# Patient Record
Sex: FEMALE | Race: OTHER | Employment: OTHER | URBAN - METROPOLITAN AREA
[De-identification: names, ages, dates, MRNs, and addresses within clinical notes are randomized per-mention and may not be internally consistent; named-entity substitution may affect disease eponyms.]

---

## 2022-03-28 ENCOUNTER — OFFICE VISIT (OUTPATIENT)
Dept: FAMILY MEDICINE CLINIC | Age: 27
End: 2022-03-28

## 2022-03-28 VITALS
SYSTOLIC BLOOD PRESSURE: 115 MMHG | BODY MASS INDEX: 25.82 KG/M2 | OXYGEN SATURATION: 98 % | WEIGHT: 123 LBS | TEMPERATURE: 97.5 F | HEART RATE: 81 BPM | DIASTOLIC BLOOD PRESSURE: 76 MMHG | HEIGHT: 58 IN | RESPIRATION RATE: 17 BRPM

## 2022-03-28 DIAGNOSIS — N92.6 MISSED MENSES: Primary | ICD-10-CM

## 2022-03-28 DIAGNOSIS — O21.9 PREGNANCY RELATED NAUSEA AND VOMITING, ANTEPARTUM: ICD-10-CM

## 2022-03-28 LAB
HCG URINE, QL. (POC): POSITIVE
VALID INTERNAL CONTROL?: YES

## 2022-03-28 PROCEDURE — 81025 URINE PREGNANCY TEST: CPT

## 2022-03-28 PROCEDURE — 0500F INITIAL PRENATAL CARE VISIT: CPT

## 2022-03-28 RX ORDER — PYRIDOXINE HCL (VITAMIN B6) 25 MG
25 TABLET ORAL DAILY
Qty: 30 TABLET | Refills: 2 | Status: SHIPPED | OUTPATIENT
Start: 2022-03-28 | End: 2022-08-31

## 2022-03-28 NOTE — PROGRESS NOTES
Chief Complaint   Patient presents with    Missed Menses    Establish Care     1. Have you been to the ER, urgent care clinic since your last visit? Hospitalized since your last visit? N/A    2. Have you seen or consulted any other health care providers outside of the [address] [place] [address] since your last visit? Include any pap smears or colon screening.  N/A

## 2022-03-28 NOTE — PROGRESS NOTES
[address] [facility] [address] [address]   Office [phone], Fax [phone]    Subjective:     Chief Complaint   Patient presents with    Missed Menses    Establish Care       History provided by patient     HPI:  Naresh Wolff is a 32 y.o.  female with past medical history of  section x2 presents for   Chief Complaint   Patient presents with    Missed Menses    Establish Care       [id number]   LMP - Approx 2022  Home Pregnancy test: positive 1 week ago  This is a desired pregnancy     History of Depression in pregnancy or post partum? No  History of GDM or DM? No  History of GHTN or HTN? No  History of pre-eclampsia? No  History of thyroid disorder? No  History of PTD? First   History of ? CS x2. Both in [place]. History of Genetic disorders? No   History of STD's? No  History of abnormal PAP? Never had a Pap  Taking prenatal vitamins? Not yet   Nausea: vomiting multiple times daily     Social: Sister and two children. FOB not involved, and patient does not want FOB involved. Pt denies fever, chills, HA, vision disturbances, RUQ pain, chest pain, SOB, N/V, urinary problems, vaginal bleeding, foul smelling vaginal discharge, loss of fluid, or contractions.        Social History     Socioeconomic History    Marital status: SINGLE     Spouse name: Not on file    Number of children: Not on file    Years of education: Not on file    Highest education level: Not on file   Occupational History    Not on file   Tobacco Use    Smoking status: Never Smoker    Smokeless tobacco: Never Used   Substance and Sexual Activity    Alcohol use: Never    Drug use: Never    Sexual activity: Not on file   Other Topics Concern    Not on file   Social History Narrative    Not on file     Social Determinants of Health     Financial Resource Strain:     Difficulty of Paying Living Expenses: Not on file   Food Insecurity:     Worried About [address] in the Last Year: Not on file    Ran Out of Food in the Last Year: Not on file   Transportation Needs:     Lack of Transportation (Medical): Not on file    Lack of Transportation (Non-Medical): Not on file   Physical Activity:     Days of Exercise per Week: Not on file    Minutes of Exercise per Session: Not on file   Stress:     Feeling of Stress : Not on file   Social Connections:     Frequency of Communication with Friends and Family: Not on file    Frequency of Social Gatherings with Friends and Family: Not on file    Attends Taoist Services: Not on file    Active Member of [address] [place] [address] [employer] or Organizations: Not on file    Attends Club or Organization Meetings: Not on file    Marital Status: Not on file   Intimate Partner Violence:     Fear of Current or Ex-Partner: Not on file    Emotionally Abused: Not on file    Physically Abused: Not on file    Sexually Abused: Not on file   Housing Stability:     Unable to Pay for Housing in the Last Year: Not on file    Number of [place] in the Last Year: Not on file    Unstable Housing in the Last Year: Not on file         Review of Systems   Constitutional: Negative for chills and fever. HENT: Negative for congestion, ear pain, hearing loss and sore throat. Eyes: Negative for blurred vision and pain. Respiratory: Negative for cough and shortness of breath. Cardiovascular: Negative for chest pain, palpitations and leg swelling. Gastrointestinal: Positive for nausea and vomiting. Negative for abdominal pain and diarrhea. Genitourinary: Negative for dysuria. Musculoskeletal: Negative for back pain. Skin: Negative for rash. Neurological: Negative for sensory change, focal weakness and headaches. Endo/Heme/Allergies: Does not bruise/bleed easily. Psychiatric/Behavioral: Negative for depression and suicidal ideas.          Objective:     Visit Vitals  /76   Pulse 81   Temp 97.5 °F (36.4 °C) (Temporal)   Resp 17   Ht 4' 9.5\" (1.461 m)   Wt 123 lb (55.8 kg)   LMP 2022 (Exact Date)   SpO2 98%   BMI 26.16 kg/m²          Physical Exam  Constitutional:       Appearance: Normal appearance. She is normal weight. HENT:      Head: Normocephalic. Cardiovascular:      Rate and Rhythm: Normal rate and regular rhythm. Pulses: Normal pulses. Heart sounds: Normal heart sounds. Pulmonary:      Effort: Pulmonary effort is normal.      Breath sounds: Normal breath sounds. Musculoskeletal:      Cervical back: Normal range of motion. Neurological:      Mental Status: She is alert. Pertinent Labs/Studies:       Assessment and orders:       ICD-10-CM ICD-9-CM    1. Missed menses  N92.6 626.4 AMB POC URINE PREGNANCY TEST, VISUAL COLOR COMPARISON   2. Pregnancy related nausea and vomiting, antepartum  O21.9 643.93            A/P:    Missed Menses: 25yo [id number] with missed menses now confirmed pregnancy in clinic. EGA 7 weeks 3 days by LMP, JOSH 2022.   - schedule IOB in approx 4 weeks   - start prenatal vitamins    Nausea/Vomiting in setting of Pregnancy: multiple times per day, however still able to tolerate liquids and some light meals   - start B6 /  Unisom     Hx : Patient endorses history of two prior C Sections, both performed in [place]. Patient states first due to fetal intolerance. Both external incisions low transverse, no records available. - will pursue repeat CS at delivery     Pt was discussed with Dr. Glenn White (attending physician). I have reviewed patient medical and social history and medications. I have reviewed pertinent labs results and other data. I have discussed the diagnosis with the patient and the intended plan as seen in the above orders. The patient has received an after-visit summary and questions were answered concerning future plans. I have discussed medication side effects and warnings with the patient as well.     Sepideh Bangura MD  Resident [address] [facility]  22

## 2022-03-30 PROBLEM — N92.6 MISSED MENSES: Status: ACTIVE | Noted: 2022-03-30

## 2022-04-25 ENCOUNTER — TELEPHONE (OUTPATIENT)
Dept: FAMILY MEDICINE CLINIC | Age: 27
End: 2022-04-25

## 2022-04-25 NOTE — TELEPHONE ENCOUNTER
----- Message from Bradley Tobin sent at 4/25/2022  3:06 PM EDT -----  Subject: Message to Provider    QUESTIONS  Information for Provider? patient is returning a call on 4/25/22 @3:06   ---------------------------------------------------------------------------  --------------  CALL BACK INFO  What is the best way for the office to contact you? OK to leave message on   voicemail  Preferred Call Back Phone Number?  [phone]  ---------------------------------------------------------------------------  --------------  SCRIPT ANSWERS  undefined

## 2022-05-02 ENCOUNTER — INITIAL PRENATAL (OUTPATIENT)
Dept: FAMILY MEDICINE CLINIC | Age: 27
End: 2022-05-02

## 2022-05-02 VITALS
OXYGEN SATURATION: 99 % | SYSTOLIC BLOOD PRESSURE: 135 MMHG | HEIGHT: 58 IN | WEIGHT: 127.4 LBS | HEART RATE: 62 BPM | BODY MASS INDEX: 26.74 KG/M2 | RESPIRATION RATE: 16 BRPM | DIASTOLIC BLOOD PRESSURE: 72 MMHG

## 2022-05-02 DIAGNOSIS — Z3A.14 14 WEEKS GESTATION OF PREGNANCY: Primary | ICD-10-CM

## 2022-05-02 PROCEDURE — 99203 OFFICE O/P NEW LOW 30 MIN: CPT | Performed by: STUDENT IN AN ORGANIZED HEALTH CARE EDUCATION/TRAINING PROGRAM

## 2022-05-02 PROCEDURE — 0501F PRENATAL FLOW SHEET: CPT | Performed by: STUDENT IN AN ORGANIZED HEALTH CARE EDUCATION/TRAINING PROGRAM

## 2022-05-02 NOTE — PROGRESS NOTES
Chief Complaint   Patient presents with    Initial Prenatal Visit      here today for her initial OB visit. States she is taking the unisom and B6 and it is helping.      Visit Vitals  /72 (BP 1 Location: Left arm, BP Patient Position: Sitting, BP Cuff Size: Adult)   Pulse 62   Resp 16   Ht 4' 9.5\" (1.461 m)   Wt 127 lb 6.4 oz (57.8 kg)   SpO2 99%   BMI 27.09 kg/m²

## 2022-05-02 NOTE — PROGRESS NOTES
I reviewed with the resident the medical history and the resident's findings on the physical examination. I discussed with the resident the patient's diagnosis and concur with the plan. 26yo  @ 14w4d by 14 wk campos  1. IUP: cannot do IOB labs today as lab closed  2. Hx PTD: bleeding at [address] [facility] per pt report   3.   Hx CS: x2     Seen by UBB

## 2022-05-02 NOTE — PROGRESS NOTES
Subjective:   Yordan Nicole is a 32 y.o.  who is being seen today for her first obstetrical visit. This is not a planned pregnancy. Nervous today, mostly for the pelvic exam. FOB is known but not going to be involved. She is at 12w1d gestation by LMP. However this was only one day of bleeding which is less than her normal. Possibly implantation bleeding? OB History    Para Term  AB Living   3 2 1 1 0 2   SAB IAB Ectopic Molar Multiple Live Births   0 0 0 0 0 2      # Outcome Date GA Lbr Rodolfo/2nd Weight Sex Delivery Anes PTL Lv   3 Current            2 Term 03/26/15 39w0d  7 lb (3.175 kg) M CS-Unspec   DISHA   1  13 36w0d  6 lb (2.722 kg) M CS-Unspec   DISHA      Birth Comments: Section due to  bleeding, unknown details        History of GDM or DM? No  History of GHTN or HTN? No  History of pre-eclampsia? No  Taking prenatal vitamins? Yes  Desires genetic testing? Yes    Allergies  No Known Allergies    Medications  Current Outpatient Medications   Medication Sig    doxylamine succinate (UNISOM) 25 mg tablet Take 1 Tablet by mouth nightly as needed for Nausea.  pyridoxine, vitamin B6, (VITAMIN B-6) 25 mg tablet Take 1 Tablet by mouth daily. No current facility-administered medications for this visit. Past Medical History  No past medical history on file. STDs? Denies    Past Surgical History  Past Surgical History:   Procedure Laterality Date    HX  SECTION  ,      CSections? Yes x2    Family History  Family History   Problem Relation Age of Onset    No Known Problems Mother     No Known Problems Father     No Known Problems Sister     No Known Problems Brother     No Known Problems Maternal Grandmother     Diabetes Maternal Grandfather     No Known Problems Paternal Grandmother     No Known Problems Paternal Grandfather      Genetic abnormalities?  Denies    Social History  Social History     Tobacco Use    Smoking status: Never Smoker    Smokeless tobacco: Never Used   Substance Use Topics    Alcohol use: Never    Drug use: Never       Objective:   Vital Signs  Visit Vitals  /72 (BP 1 Location: Left arm, BP Patient Position: Sitting, BP Cuff Size: Adult)   Pulse 62   Resp 16   Ht 4' 9.5\" (1.461 m)   Wt 127 lb 6.4 oz (57.8 kg)   LMP 2022 (Exact Date)   SpO2 99%   BMI 27.09 kg/m²       See physical exam on flowsheet  Pelvic exam not performed today as need to collect Pap but lab was unavailable. Will perform next visit. Labs  None    OB Dating Ultrasound    Patient is a 32 y.o.  with an estimated gestational age of 15w2d by LMP who presents for dating ultrasound. [facility]  OFFICE PROCEDURE PROGRESS NOTE    Chart reviewed for the following:   Kimber Whitt MD, have reviewed the History, Physical and updated the Allergic reactions for [id number] performed immediately prior to start of procedure:   Kimber Whitt MD, have performed the following reviews on Genesis Nazario prior to the start of the procedure:            * Patient was identified by name and date of birth   * Agreement on procedure being performed was verified  * Risks and Benefits explained to the patient  * Procedure site verified and marked as necessary  * Patient was positioned for comfort  * Consent was signed and verified     Time: 0830  Date of procedure: 2022  Procedure performed by:  Js Thomas MD and Dr. Jair Brush, DO  How tolerated by patient: tolerated the procedure well with no complications    Ultrasound type:  Transabdominal  Findings: single IUP with +cardiac activity, fetus active. Fluid: grossly normal     GA by LMP: 12w1d  GA by AUA: 14w4d    JOSH by ultrasound today IS NOT consistent with JOSH by LMP. CHANGE JOSH: 10/27/22  Js Thomas MD      Assessment and Plan:   Genesis Nazario is a 32 y.o.  @ 14w4d by 1st tri US here for initial OB visit.  Estimated Date of Delivery: 11/13/22    · IOB labs NOT COLLECTED- lab unavailable, collect next visit + pap (CBC without diff, blood type & screen, Rh antibody screen, rubella titer, HBsAg titer, Hep C ab, RPR, HIV, UA w/ cx, pap smear, gonorrhea/chlamydia)  · Discussed recommended weight gain (prepreg BMI <19.8 28-40lb, 19.8-26 25-35lb, 26-29 15-25lb, >29 11-20lb)  · Recommended prenatal vitamins  · Discussed optional genetic screening (1st trimester with MFM (11-14wk) vs cell free fetal DNA (>10wk) vs AFP tetra between 15-21.6wk, MSAFP only if pt has had 1st tri): Yes  · Follow up in 4 weeks      I have discussed the diagnosis with the patient and the intended plan as seen in the above orders. The patient has received an after-visit summary and questions were answered concerning future plans. I have discussed medication side effects and warnings with the patient as well. Informed pt to return to the office or go to the ER if she experiences vaginal bleeding, vaginal discharge, leaking of fluid, pelvic cramping.     Future Appointments   Date Time Provider Santy Das   6/1/2022  8:20 AM Magdy George DO [facility] AMB   6/27/2022  8:00 AM Magdy George DO [facility] DANIE       Patient discussed with Dr. Marlon Funes (attending physician)    Fuad Turner MD  Family Medicine Resident

## 2022-06-01 ENCOUNTER — ROUTINE PRENATAL (OUTPATIENT)
Dept: FAMILY MEDICINE CLINIC | Age: 27
End: 2022-06-01
Payer: COMMERCIAL

## 2022-06-01 ENCOUNTER — HOSPITAL ENCOUNTER (OUTPATIENT)
Dept: LAB | Age: 27
Discharge: HOME | End: 2022-06-01
Payer: COMMERCIAL

## 2022-06-01 VITALS
RESPIRATION RATE: 16 BRPM | OXYGEN SATURATION: 98 % | SYSTOLIC BLOOD PRESSURE: 92 MMHG | TEMPERATURE: 98.3 F | DIASTOLIC BLOOD PRESSURE: 55 MMHG | BODY MASS INDEX: 26.99 KG/M2 | HEIGHT: 58 IN | WEIGHT: 128.6 LBS | HEART RATE: 84 BPM

## 2022-06-01 DIAGNOSIS — Z98.891 HISTORY OF CESAREAN SECTION: ICD-10-CM

## 2022-06-01 DIAGNOSIS — Z3A.18 18 WEEKS GESTATION OF PREGNANCY: Primary | ICD-10-CM

## 2022-06-01 LAB
ABO + RH BLD: NORMAL
BLOOD BANK CMNT PATIENT-IMP: NORMAL
BLOOD GROUP ANTIBODIES SERPL: NORMAL
SPECIMEN EXP DATE BLD: NORMAL

## 2022-06-01 PROCEDURE — 88175 CYTOPATH C/V AUTO FLUID REDO: CPT

## 2022-06-01 PROCEDURE — 87625 HPV TYPES 16 & 18 ONLY: CPT

## 2022-06-01 PROCEDURE — 0502F SUBSEQUENT PRENATAL CARE: CPT | Performed by: STUDENT IN AN ORGANIZED HEALTH CARE EDUCATION/TRAINING PROGRAM

## 2022-06-01 PROCEDURE — 87661 TRICHOMONAS VAGINALIS AMPLIF: CPT

## 2022-06-01 PROCEDURE — 87624 HPV HI-RISK TYP POOLED RSLT: CPT

## 2022-06-01 NOTE — PROGRESS NOTES
Return OB Visit       Subjective:   Jackson Louis 32 y.o.   JOSH: 10/27/2022, by Ultrasound  GA:  18w6d. LOF: no  Vaginal bleeding: no  Fetal movement (after 20 weeks): not yet    Contractions: no  N/v: no, taking unisom  Prenatals: yes                                                                                                                                                                                                                                                                                                                                                                                        Allergies- reviewed:   No Known Allergies  Medications- reviewed:   Current Outpatient Medications   Medication Sig    doxylamine succinate (UNISOM) 25 mg tablet Take 1 Tablet by mouth nightly as needed for Nausea.  pyridoxine, vitamin B6, (VITAMIN B-6) 25 mg tablet Take 1 Tablet by mouth daily. No current facility-administered medications for this visit. Past Medical History- reviewed:  History reviewed. No pertinent past medical history.   Past Surgical History- reviewed:   Past Surgical History:   Procedure Laterality Date    HX  SECTION  ,      Social History- reviewed:  Social History     Socioeconomic History    Marital status: SINGLE     Spouse name: Not on file    Number of children: Not on file    Years of education: Not on file    Highest education level: Not on file   Occupational History    Not on file   Tobacco Use    Smoking status: Never Smoker    Smokeless tobacco: Never Used   Substance and Sexual Activity    Alcohol use: Never    Drug use: Never    Sexual activity: Not on file   Other Topics Concern    Not on file   Social History Narrative    Not on file     Social Determinants of Health     Financial Resource Strain:     Difficulty of Paying Living Expenses: Not on file   Food Insecurity:     Worried About [address] in the Last Year: Not on file    Ran Out of Food in the Last Year: Not on file   Transportation Needs:     Lack of Transportation (Medical): Not on file    Lack of Transportation (Non-Medical): Not on file   Physical Activity:     Days of Exercise per Week: Not on file    Minutes of Exercise per Session: Not on file   Stress:     Feeling of Stress : Not on file   Social Connections:     Frequency of Communication with Friends and Family: Not on file    Frequency of Social Gatherings with Friends and Family: Not on file    Attends Sabianism Services: Not on file    Active Member of [address] [place] [address] [employer] or Organizations: Not on file    Attends Club or Organization Meetings: Not on file    Marital Status: Not on file   Intimate Partner Violence:     Fear of Current or Ex-Partner: Not on file    Emotionally Abused: Not on file    Physically Abused: Not on file    Sexually Abused: Not on file   Housing Stability:     Unable to Pay for Housing in the Last Year: Not on file    Number of [place] in the Last Year: Not on file    Unstable Housing in the Last Year: Not on file     Immunizations- reviewed: There is no immunization history on file for this patient. Objective:     Visit Vitals  BP (!) 92/55 (BP 1 Location: Right upper arm, BP Patient Position: Sitting)   Pulse 84   Temp 98.3 °F (36.8 °C) (Oral)   Resp 16   Ht 4' 9.5\" (1.461 m)   Wt 128 lb 9.6 oz (58.3 kg)   LMP 02/06/2022 (Exact Date)   SpO2 98%   BMI 27.35 kg/m²       Physical Exam:  GENERAL APPEARANCE: alert, well appearing, in no apparent distress  ABDOMEN: gravid,  FHT present at 150 bpm  PSYCH: normal mood and affect      Labs  No results found for this or any previous visit (from the past 12 hour(s)).       Assessment         ICD-10-CM ICD-9-CM    1. 18 weeks gestation of pregnancy  Z3A.18 V22.2 CBC WITH AUTOMATED DIFF      HIV 1/2 AG/AB, 4TH GENERATION,W RFLX CONFIRM      TYPE & SCREEN      CULTURE, URINE      RUBELLA AB, IGG      HEMOGLOBIN FRACTIONATION      RPR      VZV AB, IGG      ANTIBODY SCREEN      HEP B SURFACE AG      PAP IG, CT-NG-TV, APTIMA HPV AND RFX 39/62,11([id number])      HEPATITIS C AB      HEPATITIS C AB      HEP B SURFACE AG      ANTIBODY SCREEN      VZV AB, IGG      RPR      HEMOGLOBIN FRACTIONATION      RUBELLA AB, IGG      TYPE & SCREEN      HIV 1/2 AG/AB, 4TH GENERATION,W RFLX CONFIRM      CBC WITH AUTOMATED DIFF      PAP IG, CT-NG-TV, APTIMA HPV AND RFX 16/18,45([id number])      CULTURE, URINE      CANCELED: GLUCOSE, GESTATIONAL 1 HR TOLERANCE   2. History of  section  Z98.891 V45.89          Plan   32 y.o.  18w6d JOSH 10/27/2022, by Ultrasound here for return OB visit     1. IUP: IOB labs and Pap today, genetic screen today, Anatomy scan  at [facility]   2. Hx PTD: bleeding at [address] [facility] per pt report   3.   Hx CS: x2      Seen by UBB       Orders Placed This Encounter    CULTURE, URINE     Standing Status:   Future     Number of Occurrences:   1     Standing Expiration Date:   2023    CBC WITH AUTOMATED DIFF     Standing Status:   Future     Number of Occurrences:   1     Standing Expiration Date:   2023    HIV 1/2 AG/AB, 4TH GENERATION,W RFLX CONFIRM     Standing Status:   Future     Number of Occurrences:   1     Standing Expiration Date:   2023    RUBELLA AB, IGG     Standing Status:   Future     Number of Occurrences:   1     Standing Expiration Date:   2023    HEMOGLOBIN FRACTIONATION     Standing Status:   Future     Number of Occurrences:   1     Standing Expiration Date:   2023    RPR     Standing Status:   Future     Number of Occurrences:   1     Standing Expiration Date:   2023    VZV AB, IGG     Standing Status:   Future     Number of Occurrences:   1     Standing Expiration Date:   2023    HEP B SURFACE AG     Standing Status:   Future     Number of Occurrences:   1     Standing Expiration Date:   2023    HEPATITIS C AB     Standing Status: Future     Number of Occurrences:   1     Standing Expiration Date:   6/1/2023    TYPE & SCREEN     ENTER SURGERY DATE IF FOR PRE-OP TESTING. Standing Status:   Future     Number of Occurrences:   1     Standing Expiration Date:   6/1/2023     Order Specific Question:   Has patient been transfused or pregnant in the last 3 mos. ? Answer:   Unknown    ANTIBODY SCREEN     Standing Status:   Future     Number of Occurrences:   1     Standing Expiration Date:   6/1/2023    PAP IG, CT-NG-TV, APTIMA HPV AND RFX 18/88,64([id number])     Standing Status:   Future     Number of Occurrences:   1     Standing Expiration Date:   6/1/2023     Order Specific Question:   Pap Source? Answer:   Cervical     Order Specific Question:   Total Hysterectomy? Answer:   No     Order Specific Question:   Supracervical Hysterectomy? Answer:   No     Order Specific Question:   Post Menopausal?     Answer:   No     Order Specific Question:   Hormone Therapy? Answer:   No     Order Specific Question:   IUD? Answer:   No     Order Specific Question:   Abnormal Bleeding? Answer:   No     Order Specific Question:   Pregnant     Answer:   Yes     Order Specific Question:   Post Partum? Answer:   No     Labor precautions discussed, including: Regular painful contractions, lasting for greater than one hour, taking your breath away; any vaginal bleeding; any leakage of fluid; or absent or decreased fetal movement. Call M.D. on call if any of these symptoms or signs occur. I have discussed the diagnosis with the patient and the intended plan as seen in the above orders. The patient has received an after-visit summary and questions were answered concerning future plans. I have discussed medication side effects and warnings with the patient as well. Informed pt to return to the office or go to the ER if she experiences vaginal bleeding, vaginal discharge, leaking of fluid, pelvic cramping.     Pt discussed with Dr. Sai Augustin (attending physician)    Tamy Daley, DO  Family Medicine Resident

## 2022-06-01 NOTE — PROGRESS NOTES
Mackenzie Rucker is a 32 y.o. female    Chief Complaint   Patient presents with    Routine Prenatal Visit     Patient is 18 weeks and 6 days. She is not having vagonal bleeding or discharge. She is not having fetal movemnt yet. She is taking her prenatal vitamins. No contractions. No other concerns. 1. Have you been to the ER, urgent care clinic since your last visit? Hospitalized since your last visit? No  2. Have you seen or consulted any other health care providers outside of the [address] [place] [address] since your last visit? Include any pap smears or colon screening. No      Visit Vitals  BP (!) 92/55 (BP 1 Location: Right upper arm, BP Patient Position: Sitting)   Pulse 84   Temp 98.3 °F (36.8 °C) (Oral)   Resp 16   Ht 4' 9.5\" (1.461 m)   Wt 128 lb 9.6 oz (58.3 kg)   SpO2 98%   BMI 27.35 kg/m²           Health Maintenance Due   Topic Date Due    Hepatitis C Screening  Never done    COVID-19 Vaccine (1) Never done    DTaP/Tdap/Td series (1 - Tdap) Never done    Pap Smear  Never done         Medication Reconciliation completed, changes noted.   Please  Update medication list.

## 2022-06-01 NOTE — PATIENT INSTRUCTIONS
Weeks 18 to 22 of Your Pregnancy: Care Instructions  Overview     Your baby is continuing to develop quickly. Sometime between 18 and 22 weeks, you'll probably start to feel your baby move. At first, these small fetal movements feel like fluttering or \"butterflies. \" Or they may feel like gas bubbles. As your baby grows, these movements will become stronger. You may also notice that your baby hiccups. Babies at this stage can now suck their thumbs. You may find that your nausea and fatigue are gone. You may feel better overall and have more energy than you did in your first trimester. But you might now also have some new discomforts, like sleep problems or leg cramps. Talk to your doctor about things you can do at home to ease these problems. Follow-up care is a key part of your treatment and safety. Be sure to make and go to all appointments, and call your doctor if you are having problems. It's also a good idea to know your test results and keep a list of the medicines you take. How can you care for yourself at home? Ease sleep problems  · Avoid caffeine in drinks or chocolate late in the day. · Get some exercise every day. · Take a warm shower or bath before bed. · Have a light snack or glass of milk at bedtime. · Do relaxation exercises in bed to calm your mind and body. · Support your legs and back with extra pillows. Try a pillow between your legs if you sleep on your side. · Do not use sleeping pills or alcohol. They could harm your baby. Ease leg cramps  · Do not massage your calf during the cramp. · Sit on a firm bed or chair. Straighten your leg, and bend your foot (flex your ankle) slowly upward, toward your knee. Bend your toes up and down. · Stand on a cool, flat surface. Stretch your toes upward, and take small steps walking on your heels. · Use a heating pad or hot water bottle to help with muscle ache. Prevent leg cramps  · Be sure to get enough calcium.  If you are worried that you are not getting enough, talk to your doctor. · Exercise every day, and stretch your legs before bed. · Take a warm bath before bed, and try leg warmers at night. Where can you learn more? Go to http://www.gray.com/  Enter N637 in the search box to learn more about \"Weeks 18 to 22 of Your Pregnancy: Care Instructions. \"  Current as of: June 16, 2021               Content Version: 13.2  © 2006-2022 Healthwise, Incorporated. Care instructions adapted under license by [employer] (which disclaims liability or warranty for this information). If you have questions about a medical condition or this instruction, always ask your healthcare professional. [address] any warranty or liability for your use of this information.

## 2022-06-02 LAB
BACTERIA SPEC CULT: NORMAL
BASOPHILS # BLD: 0.1 K/UL (ref 0–0.1)
BASOPHILS NFR BLD: 1 % (ref 0–1)
DIFFERENTIAL METHOD BLD: ABNORMAL
EOSINOPHIL # BLD: 0.1 K/UL (ref 0–0.4)
EOSINOPHIL NFR BLD: 1 % (ref 0–7)
ERYTHROCYTE [DISTWIDTH] IN BLOOD BY AUTOMATED COUNT: 12.5 % (ref 11.5–14.5)
HBV SURFACE AG SER QL: <0.1 INDEX
HBV SURFACE AG SER QL: NEGATIVE
HCT VFR BLD AUTO: 38.5 % (ref 35–47)
HCV AB SERPL QL IA: NONREACTIVE
HGB BLD-MCNC: 12.3 G/DL (ref 11.5–16)
HIV 1+2 AB+HIV1 P24 AG SERPL QL IA: NONREACTIVE
HIV12 RESULT COMMENT, HHIVC: NORMAL
IMM GRANULOCYTES # BLD AUTO: 0.1 K/UL (ref 0–0.04)
IMM GRANULOCYTES NFR BLD AUTO: 1 % (ref 0–0.5)
LYMPHOCYTES # BLD: 2.1 K/UL (ref 0.8–3.5)
LYMPHOCYTES NFR BLD: 21 % (ref 12–49)
MCH RBC QN AUTO: 33.1 PG (ref 26–34)
MCHC RBC AUTO-ENTMCNC: 31.9 G/DL (ref 30–36.5)
MCV RBC AUTO: 103.5 FL (ref 80–99)
MONOCYTES # BLD: 0.6 K/UL (ref 0–1)
MONOCYTES NFR BLD: 6 % (ref 5–13)
NEUTS SEG # BLD: 7.1 K/UL (ref 1.8–8)
NEUTS SEG NFR BLD: 70 % (ref 32–75)
NRBC # BLD: 0 K/UL (ref 0–0.01)
NRBC BLD-RTO: 0 PER 100 WBC
PLATELET # BLD AUTO: 421 K/UL (ref 150–400)
PMV BLD AUTO: 9.3 FL (ref 8.9–12.9)
RBC # BLD AUTO: 3.72 M/UL (ref 3.8–5.2)
RPR SER QL: NONREACTIVE
RUBV IGG SER-IMP: REACTIVE
RUBV IGG SERPL IA-ACNC: 176.7 IU/ML
SERVICE CMNT-IMP: NORMAL
VZV IGG SER IA-ACNC: 916 INDEX
WBC # BLD AUTO: 9.9 K/UL (ref 3.6–11)

## 2022-06-03 LAB
C TRACH RRNA SPEC QL NAA+PROBE: NEGATIVE
HGB A MFR BLD: 96.7 % (ref 96.4–98.8)
HGB A2 MFR BLD COLUMN CHROM: 2.7 % (ref 1.8–3.2)
HGB F MFR BLD: 0.6 % (ref 0–2)
HGB FRACT BLD-IMP: NORMAL
HGB S MFR BLD: 0 %
N GONORRHOEA RRNA SPEC QL NAA+PROBE: NEGATIVE
SPECIMEN SOURCE: NORMAL
T VAGINALIS RRNA SPEC QL NAA+PROBE: NEGATIVE

## 2022-06-08 ENCOUNTER — HOSPITAL ENCOUNTER (OUTPATIENT)
Dept: PERINATAL CARE | Age: 27
Discharge: HOME | End: 2022-06-08
Attending: OBSTETRICS & GYNECOLOGY
Payer: COMMERCIAL

## 2022-06-08 PROCEDURE — 76805 OB US >/= 14 WKS SNGL FETUS: CPT | Performed by: OBSTETRICS & GYNECOLOGY

## 2022-06-09 NOTE — PROGRESS NOTES
I discussed the patient's history and physical exam with the resident. I reviewed the assessment and plan and agree with the resident's documentation.

## 2022-06-27 ENCOUNTER — ROUTINE PRENATAL (OUTPATIENT)
Dept: FAMILY MEDICINE CLINIC | Age: 27
End: 2022-06-27
Payer: COMMERCIAL

## 2022-06-27 VITALS
TEMPERATURE: 98.1 F | DIASTOLIC BLOOD PRESSURE: 63 MMHG | OXYGEN SATURATION: 98 % | HEART RATE: 88 BPM | RESPIRATION RATE: 16 BRPM | WEIGHT: 130 LBS | SYSTOLIC BLOOD PRESSURE: 98 MMHG | BODY MASS INDEX: 27.29 KG/M2 | HEIGHT: 58 IN

## 2022-06-27 DIAGNOSIS — Z3A.23 23 WEEKS GESTATION OF PREGNANCY: Primary | ICD-10-CM

## 2022-06-27 PROCEDURE — 0502F SUBSEQUENT PRENATAL CARE: CPT

## 2022-06-27 NOTE — PROGRESS NOTES
Jackson Louis is a 32 y.o. female    Chief Complaint   Patient presents with    Routine Prenatal Visit     Patient is 22 weeks and 4 days. She is not having vaginal bleeding or discharge. She is taking her prenatal vitamins. She is having some fetal movement. No contractions. No other concerns. 1. Have you been to the ER, urgent care clinic since your last visit? Hospitalized since your last visit? No  2. Have you seen or consulted any other health care providers outside of the [address] [place] [address] since your last visit? Include any pap smears or colon screening. No      Visit Vitals  BP 98/63 (BP 1 Location: Right upper arm, BP Patient Position: Sitting)   Pulse 88   Temp 98.1 °F (36.7 °C) (Oral)   Resp 16   Ht 4' 9.5\" (1.461 m)   Wt 130 lb (59 kg)   SpO2 98%   BMI 27.64 kg/m²           Health Maintenance Due   Topic Date Due    COVID-19 Vaccine (1) Never done    DTaP/Tdap/Td series (1 - Tdap) Never done         Medication Reconciliation completed, changes noted.   Please  Update medication list.

## 2022-06-27 NOTE — PROGRESS NOTES
Return OB Visit       Subjective:   Ariel Westfall 32 y.o.   JOSH: 10/27/2022, by Ultrasound  GA:  22w4d. LOF: No  Vaginal bleeding: no  Fetal movement (after 20 weeks): Not yet  Contractions: No  Prenatal vitamins: yes    Pt denies fever, chills, HA, vision disturbances, RUQ pain, chest pain, SOB, N/V, urinary problems, foul smelling vaginal discharge. Allergies- reviewed:   No Known Allergies  Medications- reviewed:   Current Outpatient Medications   Medication Sig    prenatal vit no.124/iron/folic (PRENATAL VITAMIN PO) Take  by mouth.  doxylamine succinate (UNISOM) 25 mg tablet Take 1 Tablet by mouth nightly as needed for Nausea.  pyridoxine, vitamin B6, (VITAMIN B-6) 25 mg tablet Take 1 Tablet by mouth daily. No current facility-administered medications for this visit. Past Medical History- reviewed:  History reviewed. No pertinent past medical history. Past Surgical History- reviewed:   Past Surgical History:   Procedure Laterality Date    HX  SECTION  ,      Social History- reviewed:  Social History     Socioeconomic History    Marital status: SINGLE     Spouse name: Not on file    Number of children: Not on file    Years of education: Not on file    Highest education level: Not on file   Occupational History    Not on file   Tobacco Use    Smoking status: Never Smoker    Smokeless tobacco: Never Used   Substance and Sexual Activity    Alcohol use: Never    Drug use: Never    Sexual activity: Not on file   Other Topics Concern    Not on file   Social History Narrative    Not on file     Social Determinants of Health     Financial Resource Strain:     Difficulty of Paying Living Expenses: Not on file   Food Insecurity:     Worried About Running Out of Food in the Last Year: Not on file    Rigoberto of Food in the Last Year: Not on file   Transportation Needs:     Lack of Transportation (Medical):  Not on file    Lack of Transportation (Non-Medical): Not on file   Physical Activity:     Days of Exercise per Week: Not on file    Minutes of Exercise per Session: Not on file   Stress:     Feeling of Stress : Not on file   Social Connections:     Frequency of Communication with Friends and Family: Not on file    Frequency of Social Gatherings with Friends and Family: Not on file    Attends Anabaptist Services: Not on file    Active Member of [address] [place] [address] [employer] or Organizations: Not on file    Attends Club or Organization Meetings: Not on file    Marital Status: Not on file   Intimate Partner Violence:     Fear of Current or Ex-Partner: Not on file    Emotionally Abused: Not on file    Physically Abused: Not on file    Sexually Abused: Not on file   Housing Stability:     Unable to Pay for Housing in the Last Year: Not on file    Number of [place] in the Last Year: Not on file    Unstable Housing in the Last Year: Not on file     Immunizations- reviewed: There is no immunization history on file for this patient. Flu vaccine: none  Tdap none    Objective:     Visit Vitals  BP 98/63 (BP 1 Location: Right upper arm, BP Patient Position: Sitting)   Pulse 88   Temp 98.1 °F (36.7 °C) (Oral)   Resp 16   Ht 4' 9.5\" (1.461 m)   Wt 130 lb (59 kg)   LMP 2022 (Exact Date)   SpO2 98%   BMI 27.64 kg/m²       Physical Exam:  GENERAL APPEARANCE: alert, well appearing, in no apparent distress  ABDOMEN: gravid, fundal height 18 cm, FHT present at 145 bpm  PSYCH: normal mood and affect  CVE: na    Labs  No results found for this or any previous visit (from the past 12 hour(s)).       Assessment         ICD-10-CM ICD-9-CM    1. 23 weeks gestation of pregnancy  Z3A.23 V22.2          Plan   32 y.o.  22w4d JOSH 10/27/2022, by Ultrasound here for return OB visit     PNL: O+ , Ab neg, G/C neg, VZV/Rubella immune,  RPR neg, HepB/HIV/ Hep C neg, HgB 12.3, Ucx neg   Anatomy Scan 20weeks 22- normal anatomy  - 1 Hour GTT next visit  - Follow up in 4 weeks      History of  x2:   - Will need repeat  scheduled at 28wk visit     History of  Delivery: at 36 weeks in [place] due to bleeding, per patient report     · Other  · Continuity Provider: Luana Amos  · Pain mgmt. in labor: undecided  · Feeding: undecided  · Circ: NA  · Social:       Orders Placed This Encounter    prenatal vit no.124/iron/folic (PRENATAL VITAMIN PO)     Sig: Take  by mouth. Labor precautions discussed, including: Regular painful contractions, lasting for greater than one hour, taking your breath away; any vaginal bleeding; any leakage of fluid; or absent or decreased fetal movement. Call M.D. on call if any of these symptoms or signs occur. I have discussed the diagnosis with the patient and the intended plan as seen in the above orders. The patient has received an after-visit summary and questions were answered concerning future plans. I have discussed medication side effects and warnings with the patient as well. Informed pt to return to the office or go to the ER if she experiences vaginal bleeding, vaginal discharge, leaking of fluid, pelvic cramping.     Pt seen and discussed with Dr. Marlene Chan (attending physician)    Bri Blake MD  Family Medicine Resident

## 2022-06-30 PROBLEM — Z3A.23 23 WEEKS GESTATION OF PREGNANCY: Status: ACTIVE | Noted: 2022-06-30

## 2022-06-30 PROBLEM — Z3A.23 23 WEEKS GESTATION OF PREGNANCY: Status: RESOLVED | Noted: 2022-06-30 | Resolved: 2022-06-30

## 2022-07-21 NOTE — PROGRESS NOTES
I have reviewed the notes, assessments, and/or procedures performed by resident, I concur with her/his documentation of Ariel Westfall.

## 2022-07-27 ENCOUNTER — ROUTINE PRENATAL (OUTPATIENT)
Dept: FAMILY MEDICINE CLINIC | Age: 27
End: 2022-07-27
Payer: COMMERCIAL

## 2022-07-27 VITALS
OXYGEN SATURATION: 97 % | TEMPERATURE: 97.5 F | RESPIRATION RATE: 17 BRPM | SYSTOLIC BLOOD PRESSURE: 90 MMHG | WEIGHT: 131 LBS | HEIGHT: 58 IN | HEART RATE: 84 BPM | BODY MASS INDEX: 27.5 KG/M2 | DIASTOLIC BLOOD PRESSURE: 58 MMHG

## 2022-07-27 DIAGNOSIS — Z98.891 HISTORY OF CESAREAN SECTION: ICD-10-CM

## 2022-07-27 DIAGNOSIS — Z3A.26 26 WEEKS GESTATION OF PREGNANCY: Primary | ICD-10-CM

## 2022-07-27 LAB
BASOPHILS # BLD: 0 K/UL (ref 0–0.1)
BASOPHILS NFR BLD: 0 % (ref 0–1)
DIFFERENTIAL METHOD BLD: ABNORMAL
EOSINOPHIL # BLD: 0.1 K/UL (ref 0–0.4)
EOSINOPHIL NFR BLD: 1 % (ref 0–7)
ERYTHROCYTE [DISTWIDTH] IN BLOOD BY AUTOMATED COUNT: 12.2 % (ref 11.5–14.5)
GLUCOSE 1H P 100 G GLC PO SERPL-MCNC: 101 MG/DL (ref 65–140)
HCT VFR BLD AUTO: 36.1 % (ref 35–47)
HGB BLD-MCNC: 12 G/DL (ref 11.5–16)
IMM GRANULOCYTES # BLD AUTO: 0.1 K/UL (ref 0–0.04)
IMM GRANULOCYTES NFR BLD AUTO: 1 % (ref 0–0.5)
LYMPHOCYTES # BLD: 1.8 K/UL (ref 0.8–3.5)
LYMPHOCYTES NFR BLD: 22 % (ref 12–49)
MCH RBC QN AUTO: 33.2 PG (ref 26–34)
MCHC RBC AUTO-ENTMCNC: 33.2 G/DL (ref 30–36.5)
MCV RBC AUTO: 100 FL (ref 80–99)
MONOCYTES # BLD: 0.5 K/UL (ref 0–1)
MONOCYTES NFR BLD: 6 % (ref 5–13)
NEUTS SEG # BLD: 5.7 K/UL (ref 1.8–8)
NEUTS SEG NFR BLD: 70 % (ref 32–75)
NRBC # BLD: 0 K/UL (ref 0–0.01)
NRBC BLD-RTO: 0 PER 100 WBC
PLATELET # BLD AUTO: 393 K/UL (ref 150–400)
PMV BLD AUTO: 9.2 FL (ref 8.9–12.9)
RBC # BLD AUTO: 3.61 M/UL (ref 3.8–5.2)
WBC # BLD AUTO: 8.2 K/UL (ref 3.6–11)

## 2022-07-27 PROCEDURE — 0502F SUBSEQUENT PRENATAL CARE: CPT | Performed by: STUDENT IN AN ORGANIZED HEALTH CARE EDUCATION/TRAINING PROGRAM

## 2022-07-27 NOTE — PROGRESS NOTES
I reviewed with the resident the medical history and the resident's findings on the physical examination. I discussed with the resident the patient's diagnosis and concur with the plan. 28yo  at 26w6d by -    1. IUP: Rh pos  GTT and CBC today   2. Hx PTD: bleeding at [address] [facility] per pt report   3. Hx CS x 2: desires repeat, schedule at next visit   4.   HPV pos: pap , repeat pap with co-testing in 1 year per [facility]     Seen by [facility]   Estimated Date of Delivery: 10/27/22

## 2022-07-27 NOTE — PROGRESS NOTES
Return OB Visit       Subjective:   Trevor Pierre 32 y.o.   JOSH: 10/27/2022, by Ultrasound  GA:  26w6d. LOF: No  Vaginal bleeding: No  Fetal movement (after 20 weeks): Yes  Contractions: No    Pt denies fever, chills, HA, vision disturbances, RUQ pain, chest pain, SOB, N/V/D, constipation, urinary problems, foul smelling vaginal discharge, LE Edema worse than usual.     OB History    Para Term  AB Living   3 2 1 1 0 2   SAB IAB Ectopic Molar Multiple Live Births   0 0 0 0 0 2      # Outcome Date GA Lbr Rodolfo/2nd Weight Sex Delivery Anes PTL Lv   3 Current            2 Term 03/26/15 39w0d  7 lb (3.175 kg) M CS-Unspec   DISHA   1  13 36w0d  6 lb (2.722 kg) M CS-Unspec   DISHA      Birth Comments: Section due to  bleeding, unknown details       Allergies- reviewed:   No Known Allergies  Medications- reviewed:   Current Outpatient Medications   Medication Sig    prenatal vit no.124/iron/folic (PRENATAL VITAMIN PO) Take  by mouth. doxylamine succinate (UNISOM) 25 mg tablet Take 1 Tablet by mouth nightly as needed for Nausea. pyridoxine, vitamin B6, (VITAMIN B-6) 25 mg tablet Take 1 Tablet by mouth daily. No current facility-administered medications for this visit. Past Medical History- reviewed:  No past medical history on file.   Past Surgical History- reviewed:   Past Surgical History:   Procedure Laterality Date    HX  SECTION  ,      Social History- reviewed:  Social History     Socioeconomic History    Marital status: SINGLE     Spouse name: Not on file    Number of children: Not on file    Years of education: Not on file    Highest education level: Not on file   Occupational History    Not on file   Tobacco Use    Smoking status: Never    Smokeless tobacco: Never   Substance and Sexual Activity    Alcohol use: Never    Drug use: Never    Sexual activity: Not on file   Other Topics Concern    Not on file   Social History Narrative    Not on file     Social Determinants of Health     Financial Resource Strain: Not on file   Food Insecurity: Not on file   Transportation Needs: Not on file   Physical Activity: Not on file   Stress: Not on file   Social Connections: Not on file   Intimate Partner Violence: Not on file   Housing Stability: Not on file     Immunizations- reviewed: There is no immunization history on file for this patient. Objective:   Visit Vitals  BP (!) 90/58   Pulse 84   Temp 97.5 °F (36.4 °C) (Temporal)   Resp 17   Ht 4' 9.5\" (1.461 m)   Wt 131 lb (59.4 kg)   LMP 2022 (Exact Date)   SpO2 97%   BMI 27.86 kg/m²       Physical Exam:  GENERAL APPEARANCE: alert, well appearing, in no apparent distress  ABDOMEN: gravid, Fundal height 24 cm FHT present at 140  bpm  PSYCH: normal mood and affect     Labs  No results found for this or any previous visit (from the past 12 hour(s)). Assessment         ICD-10-CM ICD-9-CM    1. 26 weeks gestation of pregnancy  Z3A.26 V22.2 CBC WITH AUTOMATED DIFF      GLUCOSE, GESTATIONAL 1 HR TOLERANCE      GLUCOSE, GESTATIONAL 1 HR TOLERANCE      CBC WITH AUTOMATED DIFF      2. History of  section  Z98.891 V45.89             Plan   32 y.o.  26w6d JOSH 10/27/2022, by Ultrasound here for return OB visit     1. SIUP at 26w6d:   - PNL: O+, Ab neg, G/C neg, VZV/Rubella immune,  RPR neg, HepB/HIV/Hep C neg, HgB 12.3, Ucx neg. Pap NILM, HPV negative   - Anatomy Scan at 20 weeks (22) - normal anatomy  - Genetics: NIPT Low risk female  - CBC and 1 hr GTT today  - Follow up in 2 weeks     History of  x2:  - Repeat  to be scheduled at follow up     History of  Delivery: at 36 weeks in [place] due to bleeding, per patient report        Other  Continuity Provider: Tuck  Pain mgmt. in labor: undecided  Feeding: undecided  Circ: NA  Social: Denies Tobacco, EtoH or illict drugs.            Orders Placed This Encounter    CBC WITH AUTOMATED DIFF     Standing Status: Future     Number of Occurrences:   1     Standing Expiration Date:   7/28/2023    GLUCOSE, GESTATIONAL 1 HR TOLERANCE     Standing Status:   Future     Number of Occurrences:   1     Standing Expiration Date:   7/28/2023       Labor precautions discussed, including: Regular painful contractions, lasting for greater than one hour, taking your breath away; any vaginal bleeding; any leakage of fluid; or absent or decreased fetal movement. Call M.D. on call if any of these symptoms or signs occur. I have discussed the diagnosis with the patient and the intended plan as seen in the above orders. The patient has received an after-visit summary and questions were answered concerning future plans. I have discussed medication side effects and warnings with the patient as well. Informed pt to return to the office or go to the ER if she experiences vaginal bleeding, vaginal discharge, leaking of fluid, pelvic cramping.     Pt seen and discussed with Dr. Tyrell Barron (attending physician)    Langley Moritz, MD  Family Medicine Resident

## 2022-07-27 NOTE — PROGRESS NOTES
Chief Complaint   Patient presents with    Routine Prenatal Visit     .  26w 6d today. No bleeding or LOF.  +FM. 1. Have you been to the ER, urgent care clinic since your last visit? Hospitalized since your last visit? No    2. Have you seen or consulted any other health care providers outside of the [address] [place] [address] since your last visit? Include any pap smears or colon screening.  No

## 2022-08-09 NOTE — PROGRESS NOTES
Return OB Visit       Subjective:   Karlos Mckay 32 y.o.   JOSH: 10/27/2022, by Ultrasound  GA:  28w6d. LOF: NO  Vaginal bleeding: NO  Fetal movement (after 20 weeks): YES  Contractions: NO    Pt denies fever, chills, HA, vision disturbances, RUQ pain, chest pain, SOB, N/V/D, constipation, urinary problems, foul smelling vaginal discharge, LE Edema worse than usual.     OB History    Para Term  AB Living   3 2 1 1 0 2   SAB IAB Ectopic Molar Multiple Live Births   0 0 0 0 0 2      # Outcome Date GA Lbr Rodolfo/2nd Weight Sex Delivery Anes PTL Lv   3 Current            2 Term 03/26/15 39w0d  7 lb (3.175 kg) M CS-Unspec   DISHA   1  13 36w0d  6 lb (2.722 kg) M CS-Unspec   DISHA      Birth Comments: Section due to  bleeding, unknown details       Allergies- reviewed:   No Known Allergies  Medications- reviewed:   Current Outpatient Medications   Medication Sig    prenatal vit no.124/iron/folic (PRENATAL VITAMIN PO) Take  by mouth. doxylamine succinate (UNISOM) 25 mg tablet Take 1 Tablet by mouth nightly as needed for Nausea. (Patient not taking: Reported on 8/10/2022)    pyridoxine, vitamin B6, (VITAMIN B-6) 25 mg tablet Take 1 Tablet by mouth daily. (Patient not taking: Reported on 8/10/2022)     No current facility-administered medications for this visit. Past Medical History- reviewed:  No past medical history on file.   Past Surgical History- reviewed:   Past Surgical History:   Procedure Laterality Date    HX  SECTION  ,      Social History- reviewed:  Social History     Socioeconomic History    Marital status: SINGLE     Spouse name: Not on file    Number of children: Not on file    Years of education: Not on file    Highest education level: Not on file   Occupational History    Not on file   Tobacco Use    Smoking status: Never    Smokeless tobacco: Never   Substance and Sexual Activity    Alcohol use: Never    Drug use: Never    Sexual activity: Not on file   Other Topics Concern    Not on file   Social History Narrative    Not on file     Social Determinants of Health     Financial Resource Strain: Not on file   Food Insecurity: Not on file   Transportation Needs: Not on file   Physical Activity: Not on file   Stress: Not on file   Social Connections: Not on file   Intimate Partner Violence: Not on file   Housing Stability: Not on file     Immunizations- reviewed:   Immunization History   Administered Date(s) Administered    Tdap 08/10/2022         Objective:   Visit Vitals  BP 94/62 (BP 1 Location: Left upper arm, BP Patient Position: Sitting, BP Cuff Size: Adult)   Pulse 87   Temp 98.4 °F (36.9 °C) (Oral)   Resp 18   Ht 4' 9.5\" (1.461 m)   Wt 133 lb 3.2 oz (60.4 kg)   LMP 2022 (Exact Date)   SpO2 99%   BMI 28.33 kg/m²       Physical Exam:  GENERAL APPEARANCE: alert, well appearing, in no apparent distress  ABDOMEN: gravid, Fundal height 27cm FHT present at 135 bpm  PSYCH: normal mood and affect     Labs  No results found for this or any previous visit (from the past 12 hour(s)). Assessment         ICD-10-CM ICD-9-CM    1. 28 weeks gestation of pregnancy  Z3A.28 V22.2 HEP B SURFACE AG      RPR      HIV 1/2 AG/AB, 4TH GENERATION,W RFLX CONFIRM      TDAP, BOOSTRIX, (AGE 10 YRS+), IM      2. History of  section  Z98.891 V45.89             Plan   32 y.o.  28w6d JOSH 10/27/2022, by Ultrasound here for return OB visit     1. SIUP at 28w6d  - PNL: O+, Ab neg, G/C neg, VZV/Rubella immune,  RPR neg, HepB/HIV/Hep C neg, 3' tri HgB 12.0, Ucx neg. Pap NILM, HPV negative. Passed 1-hr GTT. - Tdap given today  - Anatomy Scan at 20 weeks (22) - normal anatomy  - Genetics: NIPT Low risk female  - Follow up in 2 weeks     History of  x2:  - Repeat  scheduled today     History of  Delivery: at 36 weeks in [place] due to bleeding, per patient report         Birth Plan  Continuity Provider: Juliana Greene mgmt.  in labor: Epidural  Feeding: Breastfeeding and formula   PPBCP: Wants contraception, not sure what she would like, possibly a tubal ligation  Circ: N/A  Social: Denies Tobacco, EtoH or illict drugs. Orders Placed This Encounter    TDAP, BOOSTRIX, (AGE 10 YRS+), IM    HEP B SURFACE AG     Standing Status:   Future     Standing Expiration Date:   8/9/2023    RPR     Standing Status:   Future     Standing Expiration Date:   8/9/2023    HIV 1/2 AG/AB, 4TH GENERATION,W RFLX CONFIRM     Standing Status:   Future     Standing Expiration Date:   8/9/2023       Labor precautions discussed, including: Regular painful contractions, lasting for greater than one hour, taking your breath away; any vaginal bleeding; any leakage of fluid; or absent or decreased fetal movement. Call M.D. on call if any of these symptoms or signs occur. I have discussed the diagnosis with the patient and the intended plan as seen in the above orders. The patient has received an after-visit summary and questions were answered concerning future plans. I have discussed medication side effects and warnings with the patient as well. Informed pt to return to the office or go to the ER if she experiences vaginal bleeding, vaginal discharge, leaking of fluid, pelvic cramping.     Pt seen and discussed with Dr. Darlyn Wheeler (attending physician)    Dora Zimmerman MD  Family Medicine Resident

## 2022-08-10 ENCOUNTER — ROUTINE PRENATAL (OUTPATIENT)
Dept: FAMILY MEDICINE CLINIC | Age: 27
End: 2022-08-10
Payer: COMMERCIAL

## 2022-08-10 VITALS
WEIGHT: 133.2 LBS | HEIGHT: 58 IN | RESPIRATION RATE: 18 BRPM | BODY MASS INDEX: 27.96 KG/M2 | HEART RATE: 87 BPM | DIASTOLIC BLOOD PRESSURE: 62 MMHG | OXYGEN SATURATION: 99 % | TEMPERATURE: 98.4 F | SYSTOLIC BLOOD PRESSURE: 94 MMHG

## 2022-08-10 DIAGNOSIS — Z3A.28 28 WEEKS GESTATION OF PREGNANCY: Primary | ICD-10-CM

## 2022-08-10 DIAGNOSIS — Z98.891 HISTORY OF CESAREAN SECTION: ICD-10-CM

## 2022-08-10 PROCEDURE — 90715 TDAP VACCINE 7 YRS/> IM: CPT | Performed by: STUDENT IN AN ORGANIZED HEALTH CARE EDUCATION/TRAINING PROGRAM

## 2022-08-10 PROCEDURE — 90471 IMMUNIZATION ADMIN: CPT | Performed by: STUDENT IN AN ORGANIZED HEALTH CARE EDUCATION/TRAINING PROGRAM

## 2022-08-10 PROCEDURE — 0502F SUBSEQUENT PRENATAL CARE: CPT | Performed by: STUDENT IN AN ORGANIZED HEALTH CARE EDUCATION/TRAINING PROGRAM

## 2022-08-10 NOTE — PROGRESS NOTES
I reviewed with the resident the medical history and the resident's findings on the physical examination. I discussed with the resident the patient's diagnosis and concur with the plan. 26yo  at 28w6d by --    1. IUP: Rh pos  GTT and CBC okay  tdap today   2. Hx PTD: bleeding at [address] [facility] per pt report   3. Hx CS x 2: desires repeat, others in [place], scheduled 10/20 at 0930 with Vescatia, chart sent but patient needs date   4.   HPV pos: pap , repeat pap with co-testing in 1 year per [facility]     Seen by [facility]   Undecided on birth control, sheet given with information   Estimated Date of Delivery: 10/27/22

## 2022-08-10 NOTE — PROGRESS NOTES
Abdirizak Lugo is a 32 y.o. female    Chief Complaint   Patient presents with    Routine Prenatal Visit     28w 6d today         LOF: no  Vaginal bleeding: no  Fetal movement (after 20 weeks): yes  Contractions: no  Dysuria: no  Headaches, blurred vision, RUQ pain: no  Taking prenatal vitamins: yes    1. Have you been to the ER, urgent care clinic since your last visit? Hospitalized since your last visit? No    2. Have you seen or consulted any other health care providers outside of the [address] [place] [address] since your last visit? Include any pap smears or colon screening. No      Visit Vitals  BP 94/62 (BP 1 Location: Left upper arm, BP Patient Position: Sitting, BP Cuff Size: Adult)   Pulse 87   Temp 98.4 °F (36.9 °C) (Oral)   Resp 18   Ht 4' 9.5\" (1.461 m)   Wt 133 lb 3.2 oz (60.4 kg)   SpO2 99%   BMI 28.33 kg/m²           Health Maintenance Due   Topic Date Due    COVID-19 Vaccine (1) Never done    DTaP/Tdap/Td series (1 - Tdap) Never done    OB 3RD TRIMESTER TDAP  Never done         Medication Reconciliation completed, changes noted.   Please Update medication list.

## 2022-08-11 LAB
HBV SURFACE AG SER QL: 0.13 INDEX
HBV SURFACE AG SER QL: NEGATIVE
HIV 1+2 AB+HIV1 P24 AG SERPL QL IA: NONREACTIVE
HIV12 RESULT COMMENT, HHIVC: NORMAL
RPR SER QL: NONREACTIVE

## 2022-08-29 NOTE — PROGRESS NOTES
Return OB Visit       Subjective:   Lauryn Paredes 32 y.o.   JOSH: 10/27/2022, by 14-wk Ultrasound  GA:  31w6d. LOF: No  Vaginal bleeding: No  Fetal movement (after 20 weeks): Yes  Contractions: No    Pt denies fever, chills, HA, vision disturbances, RUQ pain, chest pain, SOB, N/V/D, constipation, urinary problems, foul smelling vaginal discharge, LE Edema worse than usual.     OB History    Para Term  AB Living   3 2 1 1 0 2   SAB IAB Ectopic Molar Multiple Live Births   0 0 0 0 0 2      # Outcome Date GA Lbr Rodolfo/2nd Weight Sex Delivery Anes PTL Lv   3 Current            2 Term 03/26/15 39w0d  7 lb (3.175 kg) M CS-Unspec   DISHA   1  13 36w0d  6 lb (2.722 kg) M CS-Unspec   DISHA      Birth Comments: Section due to  bleeding, unknown details       Allergies- reviewed:   No Known Allergies  Medications- reviewed:   Current Outpatient Medications   Medication Sig    prenatal vit no.124/iron/folic (PRENATAL VITAMIN PO) Take  by mouth. No current facility-administered medications for this visit. Past Medical History- reviewed:  History reviewed. No pertinent past medical history.   Past Surgical History- reviewed:   Past Surgical History:   Procedure Laterality Date    HX  SECTION  ,      Social History- reviewed:  Social History     Socioeconomic History    Marital status: SINGLE     Spouse name: Not on file    Number of children: Not on file    Years of education: Not on file    Highest education level: Not on file   Occupational History    Not on file   Tobacco Use    Smoking status: Never    Smokeless tobacco: Never   Substance and Sexual Activity    Alcohol use: Never    Drug use: Never    Sexual activity: Not on file   Other Topics Concern    Not on file   Social History Narrative    Not on file     Social Determinants of Health     Financial Resource Strain: Not on file   Food Insecurity: Not on file   Transportation Needs: Not on file Physical Activity: Not on file   Stress: Not on file   Social Connections: Not on file   Intimate Partner Violence: Not on file   Housing Stability: Not on file     Immunizations- reviewed:   Immunization History   Administered Date(s) Administered    Tdap 08/10/2022         Objective:   Visit Vitals  BP 99/62 (BP 1 Location: Right upper arm, BP Patient Position: Sitting)   Pulse 84   Temp 97.9 °F (36.6 °C) (Oral)   Resp 16   Ht 4' 9.5\" (1.461 m)   Wt 135 lb (61.2 kg)   LMP 2022 (Exact Date)   SpO2 98%   BMI 28.71 kg/m²       Physical Exam:  GENERAL APPEARANCE: alert, well appearing, in no apparent distress  ABDOMEN: gravid, Fundal height 29 FHT present at 145 bpm  PSYCH: normal mood and affect     Labs  No results found for this or any previous visit (from the past 12 hour(s)). Assessment         ICD-10-CM ICD-9-CM    1. 31 weeks gestation of pregnancy  Z3A.31 V22.2             Plan   32 y.o.  31w6d JOSH 10/27/2022, by 14-wk Ultrasound here for return OB visit     SIUP at 31w6d  - PNL: O+, Ab neg, G/C neg, VZV/Rubella immune,  RPR neg, HepB/HIV/Hep C neg, 3' tri HgB 12.0, Ucx neg. Pap NILM, HPV positive (on 22, repeat in 1 year). Passed 1-hr GTT. - Immunizations: S/p Tdap   - U/S: Anatomy Scan at 20 weeks (22) - normal anatomy  - Genetics: NIPT Low risk female  -  scheduled for 10/20/22  - Follow up in 2 weeks for 33 week visit     History of  x2:  - Repeat  scheduled for 10/20/22 at 9:30am w/ Dr. Abby Donato     History of  Delivery: at 36 weeks in [place] due to bleeding, per patient report         Birth Plan  Continuity Provider: Warren sanders. in labor: Epidural  Feeding: Breastfeeding and formula   PPBCP: Wants contraception, not sure what she would like, possibly a tubal ligation  Circ: N/A  Social: Denies Tobacco, EtoH or illict drugs. No orders of the defined types were placed in this encounter.     Labor precautions discussed, including: Regular painful contractions, lasting for greater than one hour, taking your breath away; any vaginal bleeding; any leakage of fluid; or absent or decreased fetal movement. Call M.D. on call if any of these symptoms or signs occur. I have discussed the diagnosis with the patient and the intended plan as seen in the above orders. The patient has received an after-visit summary and questions were answered concerning future plans. I have discussed medication side effects and warnings with the patient as well. Informed pt to return to the office or go to the ER if she experiences vaginal bleeding, vaginal discharge, leaking of fluid, pelvic cramping.     Pt seen and discussed with Dr. Yael Rivas (attending physician)    Ivette Sanders MD  Family Medicine Resident

## 2022-08-31 ENCOUNTER — ROUTINE PRENATAL (OUTPATIENT)
Dept: FAMILY MEDICINE CLINIC | Age: 27
End: 2022-08-31
Payer: COMMERCIAL

## 2022-08-31 VITALS
TEMPERATURE: 97.9 F | WEIGHT: 135 LBS | HEIGHT: 58 IN | HEART RATE: 84 BPM | SYSTOLIC BLOOD PRESSURE: 99 MMHG | OXYGEN SATURATION: 98 % | DIASTOLIC BLOOD PRESSURE: 62 MMHG | RESPIRATION RATE: 16 BRPM | BODY MASS INDEX: 28.34 KG/M2

## 2022-08-31 DIAGNOSIS — Z3A.31 31 WEEKS GESTATION OF PREGNANCY: Primary | ICD-10-CM

## 2022-08-31 PROCEDURE — 0502F SUBSEQUENT PRENATAL CARE: CPT | Performed by: STUDENT IN AN ORGANIZED HEALTH CARE EDUCATION/TRAINING PROGRAM

## 2022-08-31 NOTE — PROGRESS NOTES
I reviewed with the resident the medical history and the resident's findings on the physical examination. I discussed with the resident the patient's diagnosis and concur with the plan. 26yo  at 31w6d by --    1. IUP: Rh pos  GTT and CBC okay  s/p tdap   2. Hx PTD: [place] [address] [facility] [place] [address] per pt report   3. Hx CS x 2: desires repeat, others in [place], scheduled 10/20 at 0930 with Ariel, chart sent but patient needs date   4.   HPV pos: pap , repeat pap with co-testing in 1 year per [facility]     Seen by [facility]   Undecided on birth control, sheet given with information   Estimated Date of Delivery: 10/27/22

## 2022-08-31 NOTE — PROGRESS NOTES
Maria Eugenia Elias is a 32 y.o. female    Chief Complaint   Patient presents with    Routine Prenatal Visit     Patient is 31 weeks and 6 days. She is not having vaginal bleeding or discharge. She is taking her prenatal vitamins. She is having fetal movement. No contractions. No other concerns. 1. Have you been to the ER, urgent care clinic since your last visit? Hospitalized since your last visit? No    2. Have you seen or consulted any other health care providers outside of the [address] [place] [address] since your last visit? Include any pap smears or colon screening. No      Visit Vitals  BP 99/62 (BP 1 Location: Right upper arm, BP Patient Position: Sitting)   Pulse 84   Temp 97.9 °F (36.6 °C) (Oral)   Resp 16   Ht 4' 9.5\" (1.461 m)   Wt 135 lb (61.2 kg)   SpO2 98%   BMI 28.71 kg/m²           Health Maintenance Due   Topic Date Due    COVID-19 Vaccine (1) Never done         Medication Reconciliation completed, changes noted.   Please  Update medication list.

## 2022-09-14 ENCOUNTER — ROUTINE PRENATAL (OUTPATIENT)
Dept: FAMILY MEDICINE CLINIC | Age: 27
End: 2022-09-14
Payer: COMMERCIAL

## 2022-09-14 VITALS
BODY MASS INDEX: 28.5 KG/M2 | TEMPERATURE: 97.5 F | WEIGHT: 134 LBS | OXYGEN SATURATION: 100 % | HEART RATE: 71 BPM | DIASTOLIC BLOOD PRESSURE: 62 MMHG | SYSTOLIC BLOOD PRESSURE: 96 MMHG

## 2022-09-14 DIAGNOSIS — Z3A.33 33 WEEKS GESTATION OF PREGNANCY: Primary | ICD-10-CM

## 2022-09-14 PROCEDURE — 90686 IIV4 VACC NO PRSV 0.5 ML IM: CPT

## 2022-09-14 PROCEDURE — 90471 IMMUNIZATION ADMIN: CPT

## 2022-09-14 PROCEDURE — 0502F SUBSEQUENT PRENATAL CARE: CPT

## 2022-09-14 NOTE — PROGRESS NOTES
Chief Complaint   Patient presents with    Routine Prenatal Visit       Patient identified with 2 patient identifiers (name and D. O. B)    Patient is a  at 33w6d    Leakage of Fluid: NO  Vaginal Bleeding: NO  Fetal Movement if > 20 weeks: YES  Prenatal vitamins: YES  Having Contractions: NO  Pain: NO    Visit Vitals  BP 96/62 (BP 1 Location: Right upper arm, BP Patient Position: Sitting, BP Cuff Size: Adult)   Pulse 71   Temp 97.5 °F (36.4 °C) (Temporal)   Wt 134 lb (60.8 kg)   LMP 2022 (Exact Date)   SpO2 100%   BMI 28.50 kg/m²       Immunization History   Administered Date(s) Administered    Tdap 08/10/2022       1. Have you been to the ER, urgent care clinic since your last visit? Hospitalized since your last visit? No    2. Have you seen or consulted any other health care providers outside of the [address] [place] [address] since your last visit? Include any pap smears or colon screening.  No

## 2022-09-14 NOTE — PROGRESS NOTES
I reviewed with the resident the medical history and the resident's findings on the physical examination. I discussed with the resident the patient's diagnosis and concur with the plan. 28yo  at 33w6d by --    1. IUP: Rh pos  GTT and CBC okay  s/p tdap  offered flu shot today   2. Hx PTD: bleeding at [address] [facility] per pt report   3. Hx CS x 2: desires repeat, others in [place], scheduled 10/20 at 0930 with Vest  4.   HPV pos: pap , repeat pap with co-testing in 1 year per [facility]     Seen by [facility]   Undecided on birth control, sheet given with information   Estimated Date of Delivery: 10/27/22

## 2022-09-28 ENCOUNTER — ROUTINE PRENATAL (OUTPATIENT)
Dept: FAMILY MEDICINE CLINIC | Age: 27
End: 2022-09-28
Payer: COMMERCIAL

## 2022-09-28 VITALS
RESPIRATION RATE: 14 BRPM | WEIGHT: 138.2 LBS | SYSTOLIC BLOOD PRESSURE: 94 MMHG | DIASTOLIC BLOOD PRESSURE: 58 MMHG | TEMPERATURE: 98.2 F | BODY MASS INDEX: 29.01 KG/M2 | HEART RATE: 82 BPM | OXYGEN SATURATION: 98 % | HEIGHT: 58 IN

## 2022-09-28 DIAGNOSIS — Z3A.35 35 WEEKS GESTATION OF PREGNANCY: Primary | ICD-10-CM

## 2022-09-28 PROCEDURE — 0502F SUBSEQUENT PRENATAL CARE: CPT | Performed by: STUDENT IN AN ORGANIZED HEALTH CARE EDUCATION/TRAINING PROGRAM

## 2022-09-28 NOTE — PROGRESS NOTES
Return OB Visit       Subjective:   Dennie Molly 32 y.o.  at 35w6d, Estimated Date of Delivery: 10/27/22 by 14wk week US      OB History:  See Chart    LOF: No  Vaginal bleeding: No  Fetal movement (after 20 weeks): Yes  Contractions: No  Taking prenatal vitamins: Yes        Allergies- reviewed:   No Known Allergies  Medications- reviewed:   Current Outpatient Medications   Medication Sig    prenatal vit no.124/iron/folic (PRENATAL VITAMIN PO) Take  by mouth. No current facility-administered medications for this visit. Past Medical History- reviewed:  History reviewed. No pertinent past medical history.   Past Surgical History- reviewed:   Past Surgical History:   Procedure Laterality Date    HX  SECTION  ,      Social History- reviewed:  Social History     Socioeconomic History    Marital status: SINGLE     Spouse name: Not on file    Number of children: Not on file    Years of education: Not on file    Highest education level: Not on file   Occupational History    Not on file   Tobacco Use    Smoking status: Never    Smokeless tobacco: Never   Substance and Sexual Activity    Alcohol use: Never    Drug use: Never    Sexual activity: Not on file   Other Topics Concern    Not on file   Social History Narrative    Not on file     Social Determinants of Health     Financial Resource Strain: Not on file   Food Insecurity: Not on file   Transportation Needs: Not on file   Physical Activity: Not on file   Stress: Not on file   Social Connections: Not on file   Intimate Partner Violence: Not on file   Housing Stability: Not on file     Immunizations- reviewed:   Immunization History   Administered Date(s) Administered    Influenza, FLUARIX, FLULAVAL, FLUZONE (age 10 mo+) AND AFLURIA, (age 1 y+), PF, 0.5mL 2022    Tdap 08/10/2022     OB History- reviewed:  OB History    Para Term  AB Living   3 2 1 1 0 2   SAB IAB Ectopic Molar Multiple Live Births   0 0 0 0 0 2      # Outcome Date GA Lbr Rodolfo/2nd Weight Sex Delivery Anes PTL Lv   3 Current            2 Term 03/26/15 39w0d  7 lb (3.175 kg) M CS-Unspec   DISHA   1  13 36w0d  6 lb (2.722 kg) M CS-Unspec   DISHA      Birth Comments: Section due to  bleeding, unknown details        Objective:   Visit Vitals  BP (!) 94/58 (BP 1 Location: Right arm, BP Patient Position: Sitting, BP Cuff Size: Adult)   Pulse 82   Temp 98.2 °F (36.8 °C)   Resp 14   Ht 4' 9.5\" (1.461 m)   Wt 138 lb 3.2 oz (62.7 kg)   LMP 2022 (Exact Date)   SpO2 98%   BMI 29.39 kg/m²       Physical Exam:  GENERAL APPEARANCE: alert, well appearing, in no apparent distress  ABDOMEN: gravid, fundal height 35 cm, FHT present at an average of 135 bpm  PSYCH: normal mood and affect  SVE: 0/0/high    Labs  No results found for this or any previous visit (from the past 12 hour(s)). Assessment         ICD-10-CM ICD-9-CM    1. 35 weeks gestation of pregnancy  Z3A.35 V22.2 CULTURE, GENITAL GROUP B STREP      CULTURE, GENITAL GROUP B STREP            Plan   32 y.o.  at 35w6d by [address] [facility]), JOSH Estimated Date of Delivery: 10/27/22 here for return OB visit. SIUP  - PNL: O+, Ab neg, G/C neg, VZV/Rubella immune,  RPR neg, HepB/HIV/Hep C neg, 3' tri HgB 12.0, Ucx neg. Pap NILM, HPV positive (on 22, repeat in 1 year). Passed 1-hr GTT. - Immunizations: S/p Tdap, s/p Flu shot  - U/S: Anatomy Scan at 20 weeks (22) - normal anatomy  - Genetics: NIPT Low risk female  -  scheduled for 10/20/22  - GBS done today     History of  x2:  - Repeat  scheduled for 10/20/22 at 9:30am w/ Dr. Artemio Kyle     History of  Delivery: at 36 weeks in [place] due to bleeding, per patient report         Birth Plan  Continuity Provider: Maria Elena Greene mgmt.  in labor: Epidural  Feeding: Breastfeeding and formula   PPBCP: Wants contraception, not sure what she would like, possibly a tubal ligation  Circ: N/A  Social: Denies Tobacco, EtoH or illict drugs. - Labor Precautions given (ROM, Painful Contraction every 5 min for > 1hr)  - Patient educated on kick counts (after 28 weeks): baby should move 10X in 2 hours (can be a kick, roll, flutter, swish). Future Appointments   Date Time Provider Santy Das   10/5/2022  8:00 AM Carlotta Albert MD [facility] BS AMB         Orders Placed This Encounter    CULTURE, GENITAL GROUP B STREP     Standing Status:   Future     Number of Occurrences:   1     Standing Expiration Date:   9/28/2023     Labor precautions discussed, including: Regular painful contractions, lasting for greater than one hour, taking your breath away; any vaginal bleeding; any leakage of fluid; or absent or decreased fetal movement. Call M.D. on call if any of these symptoms or signs occur. I have discussed the diagnosis with the patient and the intended plan as seen in the above orders. The patient has received an after-visit summary and questions were answered concerning future plans. I have discussed medication side effects and warnings with the patient as well. Informed pt to return to the office or go to the ER if she experiences vaginal bleeding, vaginal discharge, leaking of fluid, pelvic cramping.     Pt seen and discussed with Dr. Hilda Coelho (attending physician)    Nichole Mishra MD  Family Medicine Resident

## 2022-09-28 NOTE — PROGRESS NOTES
I reviewed with the resident the medical history and the resident's findings on the physical examination. I discussed with the resident the patient's diagnosis and concur with the plan. 28yo  at 35w6d by -    1. IUP: Rh pos  GTT and CBC okay  s/p tdap  s/p flu  GBS today   2. Hx PTD: bleeding at [address] [facility] per pt report   3. Hx CS x 2: desires repeat, others in [place], scheduled 10/20 at 0930 with Vest  4.   HPV pos: pap , repeat pap with co-testing in 1 year per [facility]     Seen by [facility]   Undecided on birth control, sheet given with information   Estimated Date of Delivery: 10/27/22

## 2022-09-28 NOTE — PROGRESS NOTES
Chief Complaint   Patient presents with    Follow-up     Patient here for pre-       Patient identified with 2 patient identifiers (name and D. O. B)    Patient is a  at 35w6d    Leakage of Fluid: NO  Vaginal Bleeding: NO  Fetal Movement if > 20 weeks: YES  Prenatal vitamins: YES  Having Contractions: NO  Pain: NO    Visit Vitals  BP (!) 94/58 (BP 1 Location: Right arm, BP Patient Position: Sitting, BP Cuff Size: Adult)   Pulse 82   Temp 98.2 °F (36.8 °C)   Resp 14   Ht 4' 9.5\" (1.461 m)   Wt 138 lb 3.2 oz (62.7 kg)   LMP 2022 (Exact Date)   SpO2 98%   BMI 29.39 kg/m²       Immunization History   Administered Date(s) Administered    Influenza, FLUARIX, FLULAVAL, FLUZONE (age 10 mo+) AND AFLURIA, (age 1 y+), PF, 0.5mL 2022    Tdap 08/10/2022       1. Have you been to the ER, urgent care clinic since your last visit? Hospitalized since your last visit? No    2. Have you seen or consulted any other health care providers outside of the [address] [place] [address] since your last visit? Include any pap smears or colon screening.  No

## 2022-10-02 LAB
BACTERIA SPEC CULT: NORMAL
SERVICE CMNT-IMP: NORMAL

## 2022-10-05 ENCOUNTER — ROUTINE PRENATAL (OUTPATIENT)
Dept: FAMILY MEDICINE CLINIC | Age: 27
End: 2022-10-05
Payer: COMMERCIAL

## 2022-10-05 VITALS
WEIGHT: 141 LBS | TEMPERATURE: 97.8 F | BODY MASS INDEX: 29.6 KG/M2 | HEIGHT: 58 IN | SYSTOLIC BLOOD PRESSURE: 112 MMHG | HEART RATE: 73 BPM | DIASTOLIC BLOOD PRESSURE: 65 MMHG | OXYGEN SATURATION: 94 % | RESPIRATION RATE: 18 BRPM

## 2022-10-05 DIAGNOSIS — Z3A.36 36 WEEKS GESTATION OF PREGNANCY: Primary | ICD-10-CM

## 2022-10-05 PROCEDURE — 0502F SUBSEQUENT PRENATAL CARE: CPT | Performed by: STUDENT IN AN ORGANIZED HEALTH CARE EDUCATION/TRAINING PROGRAM

## 2022-10-05 NOTE — PROGRESS NOTES
I reviewed with the resident the medical history and the resident's findings on the physical examination. I discussed with the resident the patient's diagnosis and concur with the plan. 26yo  at 36w6d by --    1. IUP: Rh pos  anatomy okay  GTT and CBC okay  s/p tdap  s/p flu  GBS neg   2. Hx PTD: bleeding at [address] [facility] per pt report   3. Hx CS x 2: desires repeat, others in [place], scheduled 10/20 at 0930 with Vest  4.   HPV pos: pap , repeat pap with co-testing in 1 year per [facility]     Seen by [facility]   Undecided on birth control, sheet given with information   Estimated Date of Delivery: 10/27/22

## 2022-10-05 NOTE — PROGRESS NOTES
Return OB Visit     Subjective:   Dori Patrick is a 32 y.o.  at 36w6d by 14wk4d scan   Estimated Date of Delivery: 10/27/22    LOF: No  Vaginal bleeding: No  Fetal movement: Yes  Contractions: No  Dysuria: No  Headaches, blurred vision, RUQ pain: No  Taking prenatal vitamins: Yes    Concerns today: None    Allergies   No Known Allergies  Medications:   Current Outpatient Medications   Medication Sig    prenatal vit no.124/iron/folic (PRENATAL VITAMIN PO) Take  by mouth. No current facility-administered medications for this visit. Past Medical History:  No past medical history on file. Past Surgical History:   Past Surgical History:   Procedure Laterality Date    HX  SECTION  ,      Social History:  Social History     Tobacco Use    Smoking status: Never    Smokeless tobacco: Never   Substance Use Topics    Alcohol use: Never    Drug use: Never     Immunizations:   Immunization History   Administered Date(s) Administered    Influenza, FLUARIX, FLULAVAL, FLUZONE (age 10 mo+) AND AFLURIA, (age 1 y+), PF, 0.5mL 2022    Tdap 08/10/2022     Objective   Visit Vitals  /65 (BP 1 Location: Right upper arm, BP Patient Position: Sitting, BP Cuff Size: Adult)   Pulse 73   Temp 97.8 °F (36.6 °C) (Temporal)   Resp 18   Ht 4' 9.5\" (1.461 m)   Wt 141 lb (64 kg)   LMP 2022 (Exact Date)   SpO2 94%   BMI 29.98 kg/m²       Physical Exam  GENERAL APPEARANCE: alert, well appearing, in no apparent distress  ABDOMEN: gravid, fundal height 37 cm, FHT present at 130 bpm  PSYCH: normal mood and affect    Labs  No results found for this or any previous visit (from the past 12 hour(s)). Assessment   Dori Patrick is a 32 y.o.  at 36w6d by 14wk scan here for a return OB visit. Estimated Date of Delivery: 10/27/22   Plan     SIUP  - PNL: O+, Ab neg, G/C neg, VZV/Rubella immune,  RPR neg, HepB/HIV/Hep C neg, 3' tri HgB 12.0, Ucx neg. Passed 1-hr GTT.    - Immunizations: S/p Tdap, s/p Flu  - U/S: Anatomy Scan at 20 weeks (22) - normal anatomy  - Genetics: NIPT Low risk female  - GBS negative  -  scheduled for 10/20/22 (Patient is aware)     History of  x2:  - Repeat  scheduled for 10/20/22 at 9:30am w/ Dr. Janine Florence     History of  Delivery: at 36 weeks in [place] due to bleeding, per patient report     Abnormal pap smear: Pap NILM, HPV positive (on 22). HPV 16, 18, 45 negative. - Repeat in 1 year. Birth Plan  Continuity Provider: Jann sanders. in labor: Epidural  Feeding: Breastfeeding and formula   PPBCP: Wants contraception, not sure what she would like, possibly a tubal ligation  Circ: N/A  Social: Denies Tobacco, EtoH or illict drugs. Labor precautions discussed, including: Regular painful contractions, lasting for greater than one hour, taking your breath away; any vaginal bleeding; any leakage of fluid; or absent or decreased fetal movement. Call M.D. on call if any of these symptoms or signs occur. I have discussed the diagnosis with the patient and the intended plan as seen in the above orders. The patient has received an after-visit summary and questions were answered concerning future plans. I have discussed medication side effects and warnings with the patient as well. Informed pt to return to the office or go to the ER if she experiences vaginal bleeding, vaginal discharge, leaking of fluid, pelvic cramping.     Patient discussed with Dr. Cj Spann (Attending Physician)    Murl Cogan, MD  Family Medicine Resident

## 2022-10-11 NOTE — PROGRESS NOTES
Return OB Visit     Subjective:   Yamilet Houser is a 32 y.o.  at 37w6d by 14wk4d scan    Estimated Date of Delivery: 10/27/22    LOF: No  Vaginal bleeding: No  Fetal movement:  Yes  Contractions: No  Dysuria: No  Headaches, blurred vision, RUQ pain: No  Taking prenatal vitamins: Yes    Concerns today: None    Allergies   No Known Allergies  Medications:   Current Outpatient Medications   Medication Sig    prenatal vit no.124/iron/folic (PRENATAL VITAMIN PO) Take  by mouth. No current facility-administered medications for this visit. Past Medical History:  No past medical history on file. Past Surgical History:   Past Surgical History:   Procedure Laterality Date    HX  SECTION  ,      Social History:  Social History     Tobacco Use    Smoking status: Never    Smokeless tobacco: Never   Substance Use Topics    Alcohol use: Never    Drug use: Never     Immunizations:   Immunization History   Administered Date(s) Administered    Influenza, FLUARIX, FLULAVAL, FLUZONE (age 10 mo+) AND AFLURIA, (age 1 y+), PF, 0.5mL 2022    Tdap 08/10/2022     Objective   Visit Vitals  /70   Pulse 80   Temp 98 °F (36.7 °C) (Oral)   Resp 17   Ht 4' 9\" (1.448 m)   Wt 139 lb (63 kg)   LMP 2022 (Exact Date)   SpO2 100%   BMI 30.08 kg/m²       Physical Exam  GENERAL APPEARANCE: alert, well appearing, in no apparent distress  ABDOMEN: gravid, fundal height 38 cm, FHT present at 130 bpm  PSYCH: normal mood and affect    Labs  No results found for this or any previous visit (from the past 12 hour(s)). Assessment   Yamilet Houser is a 32 y.o.  at 37w6d by 14wk4d scan here for a return OB visit. Estimated Date of Delivery: 10/27/22   Plan     SIUP  - PNL: O+, Ab neg, G/C neg, VZV/Rubella immune,  RPR neg, HepB/HIV/Hep C neg, 3' tri HgB 12.0, Ucx neg. Passed 1-hr GTT.    - Immunizations: S/p Tdap, s/p Flu  - U/S: Anatomy Scan at 20 weeks (22) - normal anatomy  - Genetics: NIPT Low risk female  - GBS negative  -  scheduled for 10/20/22 (Patient is aware)     History of  x2:  - Repeat  scheduled for 10/20/22 at 9:30am w/ Dr. Nathan Smith     History of  Delivery: at 36 weeks in [place] due to bleeding, per patient report      Abnormal pap smear: Pap NILM, HPV positive (on 22). HPV 16, 18, 45 negative. - Repeat in 1 year. Follow up:  10/19/2022 with Dr. Andrea Olvera (patient is awre)     Birth Plan  Continuity Provider: Kenney Lazcano. in labor: Epidural  Feeding: Breastfeeding and formula   PPBCP: Wants contraception, not sure what she would like, possibly a tubal ligation  Circ: N/A  Social: Denies Tobacco, EtoH or illict drugs. Labor precautions discussed, including: Regular painful contractions, lasting for greater than one hour, taking your breath away; any vaginal bleeding; any leakage of fluid; or absent or decreased fetal movement. Call M.D. on call if any of these symptoms or signs occur. I have discussed the diagnosis with the patient and the intended plan as seen in the above orders. The patient has received an after-visit summary and questions were answered concerning future plans. I have discussed medication side effects and warnings with the patient as well. Informed pt to return to the office or go to the ER if she experiences vaginal bleeding, vaginal discharge, leaking of fluid, pelvic cramping.     Patient discussed with Dr. [place] MD  Family Medicine Resident

## 2022-10-12 ENCOUNTER — ROUTINE PRENATAL (OUTPATIENT)
Dept: FAMILY MEDICINE CLINIC | Age: 27
End: 2022-10-12
Payer: COMMERCIAL

## 2022-10-12 VITALS
OXYGEN SATURATION: 100 % | TEMPERATURE: 98 F | BODY MASS INDEX: 29.99 KG/M2 | HEIGHT: 57 IN | WEIGHT: 139 LBS | DIASTOLIC BLOOD PRESSURE: 70 MMHG | RESPIRATION RATE: 17 BRPM | HEART RATE: 80 BPM | SYSTOLIC BLOOD PRESSURE: 110 MMHG

## 2022-10-12 DIAGNOSIS — Z3A.37 37 WEEKS GESTATION OF PREGNANCY: Primary | ICD-10-CM

## 2022-10-12 PROCEDURE — 0502F SUBSEQUENT PRENATAL CARE: CPT | Performed by: STUDENT IN AN ORGANIZED HEALTH CARE EDUCATION/TRAINING PROGRAM

## 2022-10-18 NOTE — PROGRESS NOTES
Return OB Visit       Subjective:   Kandis Ann 32 y.o.  at 38w6d, Estimated Date of Delivery: 10/27/22 by 14w4d US    Patient reports feeling well. No new concerns at this time. Patient denies headache, visual disturbances, CP, SOB, RUQ pain, dysuria, and calf tenderness. Pregnancy complicated by h/o c/sx2. (First one due to \" bleeding\" unknown details)    OB History:  See Chart    LOF: no  Vaginal bleeding: no  Fetal movement (after 20 weeks): yes  Contractions: no  Taking prenatal vitamins: yes      Allergies- reviewed:   No Known Allergies  Medications- reviewed:   Current Outpatient Medications   Medication Sig    prenatal vit no.124/iron/folic (PRENATAL VITAMIN PO) Take  by mouth. No current facility-administered medications for this visit. Past Medical History- reviewed:  No past medical history on file.   Past Surgical History- reviewed:   Past Surgical History:   Procedure Laterality Date    HX  SECTION  ,      Social History- reviewed:  Social History     Socioeconomic History    Marital status: SINGLE     Spouse name: Not on file    Number of children: Not on file    Years of education: Not on file    Highest education level: Not on file   Occupational History    Not on file   Tobacco Use    Smoking status: Never    Smokeless tobacco: Never   Substance and Sexual Activity    Alcohol use: Never    Drug use: Never    Sexual activity: Not on file   Other Topics Concern    Not on file   Social History Narrative    Not on file     Social Determinants of Health     Financial Resource Strain: Not on file   Food Insecurity: Not on file   Transportation Needs: Not on file   Physical Activity: Not on file   Stress: Not on file   Social Connections: Not on file   Intimate Partner Violence: Not on file   Housing Stability: Not on file     Immunizations- reviewed:   Immunization History   Administered Date(s) Administered    Influenza, FLUARIX, [place]bert  (age 10 mo+) AND AFLURIA, (age 1 y+), PF, 0.5mL 2022    Tdap 08/10/2022     OB History- reviewed:  OB History    Para Term  AB Living   3 2 1 1 0 2   SAB IAB Ectopic Molar Multiple Live Births   0 0 0 0 0 2      # Outcome Date GA Lbr Rodolfo/2nd Weight Sex Delivery Anes PTL Lv   3 Current            2 Term 03/26/15 39w0d  7 lb (3.175 kg) M CS-Unspec   DISHA   1  13 36w0d  6 lb (2.722 kg) M CS-Unspec   DISHA      Birth Comments: Section due to  bleeding, unknown details        Objective:   Visit Vitals  /81 (BP 1 Location: Left arm, BP Patient Position: Sitting, BP Cuff Size: Adult)   Pulse 70   Temp 97.3 °F (36.3 °C) (Oral)   Resp 16   Wt 144 lb (65.3 kg)   LMP 2022 (Exact Date)   SpO2 97%   BMI 31.16 kg/m²       Physical Exam:  GENERAL APPEARANCE: alert, well appearing, in no apparent distress  ABDOMEN: gravid, fundal height deferred, FHT present at an average of 145 bpm  PSYCH: normal mood and affect    Labs  No results found for this or any previous visit (from the past 12 hour(s)). Assessment         ICD-10-CM ICD-9-CM    1. 38 weeks gestation of pregnancy  Z3A.38 V22.2             Plan   32 y.o.  at 38w6d by 14w4d scan, JOSH Estimated Date of Delivery: 10/27/22 here for return OB visit. SIUP  - PNL: O+, Ab neg, G/C neg, VZV/Rubella immune,  RPR neg, HepB/HIV/Hep C neg, 3' tri HgB 12.0, Ucx neg. Passed 1-hr GTT. - Immunizations: S/p Tdap, s/p Flu  - U/S: Anatomy Scan at 20 weeks (22) - normal anatomy  - Genetics: NIPT Low risk female  - GBS negative  -  scheduled for 10/20/22 (Patient is aware)     History of  x2:  - Repeat  scheduled for 10/20/22 at 9:30am w/ Dr. Antonio Stone     History of  Delivery: at 36 weeks in [place] due to bleeding, per patient report      Abnormal pap smear: Pap NILM, HPV positive (on 22). HPV 16, 18, 45 negative. - Repeat in 1 year.       Birth Plan  Continuity Provider: Christopher Irwin, Stephania  Pain mgmt. in labor: Epidural  Feeding: Breastfeeding and formula   PPBCP: Wants contraception, not sure what she would like, possibly a tubal ligation  Circ: N/A  Social: Denies Tobacco, EtoH or illict drugs. No orders of the defined types were placed in this encounter. Labor precautions discussed, including: Regular painful contractions, lasting for greater than one hour, taking your breath away; any vaginal bleeding; any leakage of fluid; or absent or decreased fetal movement. Call M.D. on call if any of these symptoms or signs occur. I have discussed the diagnosis with the patient and the intended plan as seen in the above orders. The patient has received an after-visit summary and questions were answered concerning future plans. I have discussed medication side effects and warnings with the patient as well. Informed pt to return to the office or go to the ER if she experiences vaginal bleeding, vaginal discharge, leaking of fluid, pelvic cramping.     Pt seen and discussed with Dr. Gage Cohen (attending physician)    Carmine Whitney MD  Family Medicine Resident

## 2022-10-19 ENCOUNTER — ROUTINE PRENATAL (OUTPATIENT)
Dept: FAMILY MEDICINE CLINIC | Age: 27
End: 2022-10-19
Payer: COMMERCIAL

## 2022-10-19 VITALS
TEMPERATURE: 97.3 F | RESPIRATION RATE: 16 BRPM | BODY MASS INDEX: 31.16 KG/M2 | DIASTOLIC BLOOD PRESSURE: 81 MMHG | HEART RATE: 70 BPM | WEIGHT: 144 LBS | SYSTOLIC BLOOD PRESSURE: 123 MMHG | OXYGEN SATURATION: 97 %

## 2022-10-19 DIAGNOSIS — Z3A.38 38 WEEKS GESTATION OF PREGNANCY: Primary | ICD-10-CM

## 2022-10-19 PROCEDURE — 0502F SUBSEQUENT PRENATAL CARE: CPT | Performed by: STUDENT IN AN ORGANIZED HEALTH CARE EDUCATION/TRAINING PROGRAM

## 2022-10-19 PROCEDURE — 59426 ANTEPARTUM CARE ONLY: CPT | Performed by: STUDENT IN AN ORGANIZED HEALTH CARE EDUCATION/TRAINING PROGRAM

## 2022-10-19 NOTE — CALL BACK NOTE
Personally spoke with Lucita Jimenez using  to reschedule  time 2 hours earlier. I told patient to arrive at 5:30 AM tomorrow morning and she agreed.  Section is scheduled for 7:30 AM.    Ashley Mckee MD  Family Medicine Resident

## 2022-10-19 NOTE — PROGRESS NOTES
Chief Complaint   Patient presents with    Routine Prenatal Visit       Patient identified with 2 patient identifiers (name and D. O. B)    Patient is a  at 38w6d    Leakage of Fluid: NO  Vaginal Bleeding: NO  Fetal Movement: YES  Prenatal vitamins: YES  Having Contractions: NO  Pain: NO    Visit Vitals  /81 (BP 1 Location: Left arm, BP Patient Position: Sitting, BP Cuff Size: Adult)   Pulse 70   Temp 97.3 °F (36.3 °C) (Oral)   Resp 16   Wt 144 lb (65.3 kg)   LMP 2022 (Exact Date)   SpO2 97%   BMI 31.16 kg/m²       Immunization History   Administered Date(s) Administered    Influenza, FLUARIX, FLULAVAL, FLUZONE (age 10 mo+) AND AFLURIA, (age 1 y+), PF, 0.5mL 2022    Tdap 08/10/2022       1. Have you been to the ER, urgent care clinic since your last visit? Hospitalized since your last visit? No    2. Have you seen or consulted any other health care providers outside of the [address] [place] [address] since your last visit? Include any pap smears or colon screening.  No

## 2022-10-20 ENCOUNTER — ANESTHESIA (OUTPATIENT)
Dept: LABOR AND DELIVERY | Age: 27
DRG: 540 | End: 2022-10-20
Payer: MEDICAID

## 2022-10-20 ENCOUNTER — ANESTHESIA EVENT (OUTPATIENT)
Dept: LABOR AND DELIVERY | Age: 27
DRG: 540 | End: 2022-10-20
Payer: MEDICAID

## 2022-10-20 ENCOUNTER — HOSPITAL ENCOUNTER (INPATIENT)
Age: 27
LOS: 2 days | Discharge: HOME | DRG: 540 | End: 2022-10-22
Attending: FAMILY MEDICINE | Admitting: FAMILY MEDICINE
Payer: MEDICAID

## 2022-10-20 DIAGNOSIS — Z98.891 S/P CESAREAN SECTION: Primary | ICD-10-CM

## 2022-10-20 PROBLEM — Z34.90 PREGNANCY: Status: ACTIVE | Noted: 2022-10-20

## 2022-10-20 LAB
ABO + RH BLD: NORMAL
BASOPHILS # BLD: 0.1 K/UL (ref 0–0.1)
BASOPHILS NFR BLD: 1 % (ref 0–1)
BLOOD GROUP ANTIBODIES SERPL: NORMAL
DIFFERENTIAL METHOD BLD: ABNORMAL
EOSINOPHIL # BLD: 0.1 K/UL (ref 0–0.4)
EOSINOPHIL NFR BLD: 1 % (ref 0–7)
ERYTHROCYTE [DISTWIDTH] IN BLOOD BY AUTOMATED COUNT: 13.2 % (ref 11.5–14.5)
HCT VFR BLD AUTO: 38.2 % (ref 35–47)
HGB BLD-MCNC: 13.1 G/DL (ref 11.5–16)
IMM GRANULOCYTES # BLD AUTO: 0.1 K/UL (ref 0–0.04)
IMM GRANULOCYTES NFR BLD AUTO: 1 % (ref 0–0.5)
LYMPHOCYTES # BLD: 2 K/UL (ref 0.8–3.5)
LYMPHOCYTES NFR BLD: 22 % (ref 12–49)
MCH RBC QN AUTO: 31.7 PG (ref 26–34)
MCHC RBC AUTO-ENTMCNC: 34.3 G/DL (ref 30–36.5)
MCV RBC AUTO: 92.5 FL (ref 80–99)
MONOCYTES # BLD: 0.7 K/UL (ref 0–1)
MONOCYTES NFR BLD: 7 % (ref 5–13)
NEUTS SEG # BLD: 6 K/UL (ref 1.8–8)
NEUTS SEG NFR BLD: 68 % (ref 32–75)
NRBC # BLD: 0 K/UL (ref 0–0.01)
NRBC BLD-RTO: 0 PER 100 WBC
PLATELET # BLD AUTO: 308 K/UL (ref 150–400)
PMV BLD AUTO: 10.3 FL (ref 8.9–12.9)
RBC # BLD AUTO: 4.13 M/UL (ref 3.8–5.2)
SPECIMEN EXP DATE BLD: NORMAL
WBC # BLD AUTO: 8.8 K/UL (ref 3.6–11)

## 2022-10-20 PROCEDURE — 75410000003 HC RECOV DEL/VAG/CSECN EA 0.5 HR: Performed by: FAMILY MEDICINE

## 2022-10-20 PROCEDURE — 77030007866 HC KT SPN ANES BBMI -B: Performed by: ANESTHESIOLOGY

## 2022-10-20 PROCEDURE — 4A1HXCZ MONITORING OF PRODUCTS OF CONCEPTION, CARDIAC RATE, EXTERNAL APPROACH: ICD-10-PCS | Performed by: OBSTETRICS & GYNECOLOGY

## 2022-10-20 PROCEDURE — 36415 COLL VENOUS BLD VENIPUNCTURE: CPT

## 2022-10-20 PROCEDURE — 74011250636 HC RX REV CODE- 250/636

## 2022-10-20 PROCEDURE — 86900 BLOOD TYPING SEROLOGIC ABO: CPT

## 2022-10-20 PROCEDURE — 59515 CESAREAN DELIVERY: CPT | Performed by: OBSTETRICS & GYNECOLOGY

## 2022-10-20 PROCEDURE — 76010000391 HC C SECN FIRST 1 HR: Performed by: FAMILY MEDICINE

## 2022-10-20 PROCEDURE — 74011000250 HC RX REV CODE- 250: Performed by: NURSE ANESTHETIST, CERTIFIED REGISTERED

## 2022-10-20 PROCEDURE — 76010000392 HC C SECN EA ADDL 0.5 HR: Performed by: FAMILY MEDICINE

## 2022-10-20 PROCEDURE — 74011000250 HC RX REV CODE- 250

## 2022-10-20 PROCEDURE — 74011250636 HC RX REV CODE- 250/636: Performed by: NURSE ANESTHETIST, CERTIFIED REGISTERED

## 2022-10-20 PROCEDURE — 74011250636 HC RX REV CODE- 250/636: Performed by: ANESTHESIOLOGY

## 2022-10-20 PROCEDURE — 74011250637 HC RX REV CODE- 250/637

## 2022-10-20 PROCEDURE — 65270000029 HC RM PRIVATE

## 2022-10-20 PROCEDURE — 85025 COMPLETE CBC W/AUTO DIFF WBC: CPT

## 2022-10-20 PROCEDURE — 76060000078 HC EPIDURAL ANESTHESIA: Performed by: FAMILY MEDICINE

## 2022-10-20 RX ORDER — SODIUM CHLORIDE 0.9 % (FLUSH) 0.9 %
5-40 SYRINGE (ML) INJECTION EVERY 8 HOURS
Status: DISCONTINUED | OUTPATIENT
Start: 2022-10-20 | End: 2022-10-20 | Stop reason: HOSPADM

## 2022-10-20 RX ORDER — OXYTOCIN/RINGER'S LACTATE 30/500 ML
10 PLASTIC BAG, INJECTION (ML) INTRAVENOUS AS NEEDED
Status: DISCONTINUED | OUTPATIENT
Start: 2022-10-20 | End: 2022-10-20

## 2022-10-20 RX ORDER — OXYCODONE HYDROCHLORIDE 5 MG/1
5 TABLET ORAL
Status: DISCONTINUED | OUTPATIENT
Start: 2022-10-20 | End: 2022-10-20

## 2022-10-20 RX ORDER — OXYTOCIN/RINGER'S LACTATE 30/500 ML
10 PLASTIC BAG, INJECTION (ML) INTRAVENOUS AS NEEDED
Status: CANCELLED | OUTPATIENT
Start: 2022-10-20

## 2022-10-20 RX ORDER — OXYCODONE HYDROCHLORIDE 5 MG/1
5 TABLET ORAL
Status: ACTIVE | OUTPATIENT
Start: 2022-10-20 | End: 2022-10-21

## 2022-10-20 RX ORDER — KETOROLAC TROMETHAMINE 30 MG/ML
30 INJECTION, SOLUTION INTRAMUSCULAR; INTRAVENOUS
Status: DISPENSED | OUTPATIENT
Start: 2022-10-20 | End: 2022-10-21

## 2022-10-20 RX ORDER — SIMETHICONE 80 MG
80 TABLET,CHEWABLE ORAL AS NEEDED
Status: DISCONTINUED | OUTPATIENT
Start: 2022-10-20 | End: 2022-10-22 | Stop reason: HOSPADM

## 2022-10-20 RX ORDER — SODIUM CHLORIDE, SODIUM LACTATE, POTASSIUM CHLORIDE, CALCIUM CHLORIDE 600; 310; 30; 20 MG/100ML; MG/100ML; MG/100ML; MG/100ML
INJECTION, SOLUTION INTRAVENOUS
Status: DISCONTINUED | OUTPATIENT
Start: 2022-10-20 | End: 2022-10-20 | Stop reason: HOSPADM

## 2022-10-20 RX ORDER — IBUPROFEN 800 MG/1
800 TABLET ORAL EVERY 8 HOURS
Status: DISCONTINUED | OUTPATIENT
Start: 2022-10-21 | End: 2022-10-21

## 2022-10-20 RX ORDER — NALOXONE HYDROCHLORIDE 0.4 MG/ML
0.2 INJECTION, SOLUTION INTRAMUSCULAR; INTRAVENOUS; SUBCUTANEOUS
Status: ACTIVE | OUTPATIENT
Start: 2022-10-20 | End: 2022-10-21

## 2022-10-20 RX ORDER — SODIUM CHLORIDE 0.9 % (FLUSH) 0.9 %
5-40 SYRINGE (ML) INJECTION AS NEEDED
Status: DISCONTINUED | OUTPATIENT
Start: 2022-10-20 | End: 2022-10-22 | Stop reason: HOSPADM

## 2022-10-20 RX ORDER — ONDANSETRON 2 MG/ML
INJECTION INTRAMUSCULAR; INTRAVENOUS AS NEEDED
Status: DISCONTINUED | OUTPATIENT
Start: 2022-10-20 | End: 2022-10-20 | Stop reason: HOSPADM

## 2022-10-20 RX ORDER — OXYTOCIN/RINGER'S LACTATE 30/500 ML
87.3 PLASTIC BAG, INJECTION (ML) INTRAVENOUS AS NEEDED
Status: DISCONTINUED | OUTPATIENT
Start: 2022-10-20 | End: 2022-10-22 | Stop reason: HOSPADM

## 2022-10-20 RX ORDER — SODIUM CHLORIDE 0.9 % (FLUSH) 0.9 %
5-40 SYRINGE (ML) INJECTION AS NEEDED
Status: CANCELLED | OUTPATIENT
Start: 2022-10-20

## 2022-10-20 RX ORDER — OXYTOCIN/RINGER'S LACTATE 30/500 ML
10 PLASTIC BAG, INJECTION (ML) INTRAVENOUS AS NEEDED
Status: DISCONTINUED | OUTPATIENT
Start: 2022-10-20 | End: 2022-10-22 | Stop reason: HOSPADM

## 2022-10-20 RX ORDER — SODIUM CHLORIDE 0.9 % (FLUSH) 0.9 %
5-40 SYRINGE (ML) INJECTION AS NEEDED
Status: DISCONTINUED | OUTPATIENT
Start: 2022-10-20 | End: 2022-10-20 | Stop reason: HOSPADM

## 2022-10-20 RX ORDER — OXYTOCIN/RINGER'S LACTATE 30/500 ML
87.3 PLASTIC BAG, INJECTION (ML) INTRAVENOUS AS NEEDED
Status: DISCONTINUED | OUTPATIENT
Start: 2022-10-20 | End: 2022-10-20

## 2022-10-20 RX ORDER — SODIUM CHLORIDE, SODIUM LACTATE, POTASSIUM CHLORIDE, CALCIUM CHLORIDE 600; 310; 30; 20 MG/100ML; MG/100ML; MG/100ML; MG/100ML
1000 INJECTION, SOLUTION INTRAVENOUS CONTINUOUS
Status: DISCONTINUED | OUTPATIENT
Start: 2022-10-20 | End: 2022-10-20 | Stop reason: HOSPADM

## 2022-10-20 RX ORDER — SODIUM CHLORIDE 0.9 % (FLUSH) 0.9 %
5-40 SYRINGE (ML) INJECTION EVERY 8 HOURS
Status: CANCELLED | OUTPATIENT
Start: 2022-10-20

## 2022-10-20 RX ORDER — OXYCODONE HYDROCHLORIDE 5 MG/1
10 TABLET ORAL
Status: DISCONTINUED | OUTPATIENT
Start: 2022-10-20 | End: 2022-10-20

## 2022-10-20 RX ORDER — SODIUM CHLORIDE, SODIUM LACTATE, POTASSIUM CHLORIDE, CALCIUM CHLORIDE 600; 310; 30; 20 MG/100ML; MG/100ML; MG/100ML; MG/100ML
1000 INJECTION, SOLUTION INTRAVENOUS CONTINUOUS
Status: CANCELLED | OUTPATIENT
Start: 2022-10-20

## 2022-10-20 RX ORDER — OXYTOCIN 10 [USP'U]/ML
INJECTION, SOLUTION INTRAMUSCULAR; INTRAVENOUS AS NEEDED
Status: DISCONTINUED | OUTPATIENT
Start: 2022-10-20 | End: 2022-10-20 | Stop reason: HOSPADM

## 2022-10-20 RX ORDER — KETOROLAC TROMETHAMINE 30 MG/ML
INJECTION, SOLUTION INTRAMUSCULAR; INTRAVENOUS AS NEEDED
Status: DISCONTINUED | OUTPATIENT
Start: 2022-10-20 | End: 2022-10-20 | Stop reason: HOSPADM

## 2022-10-20 RX ORDER — OXYCODONE HYDROCHLORIDE 5 MG/1
10 TABLET ORAL
Status: ACTIVE | OUTPATIENT
Start: 2022-10-20 | End: 2022-10-21

## 2022-10-20 RX ORDER — DIPHENHYDRAMINE HYDROCHLORIDE 50 MG/ML
12.5 INJECTION, SOLUTION INTRAMUSCULAR; INTRAVENOUS
Status: ACTIVE | OUTPATIENT
Start: 2022-10-20 | End: 2022-10-21

## 2022-10-20 RX ORDER — NALOXONE HYDROCHLORIDE 0.4 MG/ML
0.4 INJECTION, SOLUTION INTRAMUSCULAR; INTRAVENOUS; SUBCUTANEOUS AS NEEDED
Status: DISCONTINUED | OUTPATIENT
Start: 2022-10-20 | End: 2022-10-22 | Stop reason: HOSPADM

## 2022-10-20 RX ORDER — MORPHINE SULFATE 0.5 MG/ML
INJECTION, SOLUTION EPIDURAL; INTRATHECAL; INTRAVENOUS AS NEEDED
Status: DISCONTINUED | OUTPATIENT
Start: 2022-10-20 | End: 2022-10-20 | Stop reason: HOSPADM

## 2022-10-20 RX ORDER — SODIUM CHLORIDE 0.9 % (FLUSH) 0.9 %
5-40 SYRINGE (ML) INJECTION EVERY 8 HOURS
Status: DISCONTINUED | OUTPATIENT
Start: 2022-10-20 | End: 2022-10-21

## 2022-10-20 RX ORDER — OXYTOCIN/RINGER'S LACTATE 30/500 ML
87.3 PLASTIC BAG, INJECTION (ML) INTRAVENOUS AS NEEDED
Status: CANCELLED | OUTPATIENT
Start: 2022-10-20

## 2022-10-20 RX ORDER — BUPIVACAINE HYDROCHLORIDE 7.5 MG/ML
INJECTION, SOLUTION EPIDURAL; RETROBULBAR AS NEEDED
Status: DISCONTINUED | OUTPATIENT
Start: 2022-10-20 | End: 2022-10-20 | Stop reason: HOSPADM

## 2022-10-20 RX ORDER — ACETAMINOPHEN 325 MG/1
650 TABLET ORAL
Status: DISCONTINUED | OUTPATIENT
Start: 2022-10-20 | End: 2022-10-22 | Stop reason: HOSPADM

## 2022-10-20 RX ORDER — SODIUM CHLORIDE, SODIUM LACTATE, POTASSIUM CHLORIDE, CALCIUM CHLORIDE 600; 310; 30; 20 MG/100ML; MG/100ML; MG/100ML; MG/100ML
125 INJECTION, SOLUTION INTRAVENOUS CONTINUOUS
Status: DISCONTINUED | OUTPATIENT
Start: 2022-10-20 | End: 2022-10-22 | Stop reason: HOSPADM

## 2022-10-20 RX ADMIN — SODIUM CHLORIDE, POTASSIUM CHLORIDE, SODIUM LACTATE AND CALCIUM CHLORIDE 125 ML/HR: 600; 310; 30; 20 INJECTION, SOLUTION INTRAVENOUS at 21:25

## 2022-10-20 RX ADMIN — BUPIVACAINE HYDROCHLORIDE 1.2 ML: 7.5 INJECTION, SOLUTION EPIDURAL; RETROBULBAR at 09:43

## 2022-10-20 RX ADMIN — SODIUM CHLORIDE, POTASSIUM CHLORIDE, SODIUM LACTATE AND CALCIUM CHLORIDE: 600; 310; 30; 20 INJECTION, SOLUTION INTRAVENOUS at 09:32

## 2022-10-20 RX ADMIN — SODIUM CHLORIDE, POTASSIUM CHLORIDE, SODIUM LACTATE AND CALCIUM CHLORIDE 125 ML/HR: 600; 310; 30; 20 INJECTION, SOLUTION INTRAVENOUS at 13:45

## 2022-10-20 RX ADMIN — KETOROLAC TROMETHAMINE 30 MG: 30 INJECTION, SOLUTION INTRAMUSCULAR at 17:23

## 2022-10-20 RX ADMIN — SODIUM CHLORIDE, POTASSIUM CHLORIDE, SODIUM LACTATE AND CALCIUM CHLORIDE: 600; 310; 30; 20 INJECTION, SOLUTION INTRAVENOUS at 10:01

## 2022-10-20 RX ADMIN — CEFAZOLIN SODIUM 2 G: 1 POWDER, FOR SOLUTION INTRAMUSCULAR; INTRAVENOUS at 09:58

## 2022-10-20 RX ADMIN — ONDANSETRON HYDROCHLORIDE 4 MG: 2 SOLUTION INTRAMUSCULAR; INTRAVENOUS at 09:36

## 2022-10-20 RX ADMIN — MORPHINE SULFATE 200 MCG: 0.5 INJECTION, SOLUTION EPIDURAL; INTRATHECAL; INTRAVENOUS at 09:43

## 2022-10-20 RX ADMIN — KETOROLAC TROMETHAMINE 30 MG: 30 INJECTION, SOLUTION INTRAMUSCULAR; INTRAVENOUS at 10:30

## 2022-10-20 RX ADMIN — OXYTOCIN 30 UNITS: 10 INJECTION, SOLUTION INTRAMUSCULAR; INTRAVENOUS at 10:13

## 2022-10-20 RX ADMIN — ACETAMINOPHEN 650 MG: 325 TABLET, FILM COATED ORAL at 13:04

## 2022-10-20 NOTE — ANESTHESIA PREPROCEDURE EVALUATION
Relevant Problems   No relevant active problems       Anesthetic History   No history of anesthetic complications            Review of Systems / Medical History  Patient summary reviewed    Pulmonary  Within defined limits                 Neuro/Psych   Within defined limits           Cardiovascular                  Exercise tolerance: >4 METS     GI/Hepatic/Renal                Endo/Other             Other Findings              Physical Exam    Airway  Mallampati: II  TM Distance: 4 - 6 cm  Neck ROM: normal range of motion        Cardiovascular    Rhythm: regular  Rate: normal         Dental  No notable dental hx       Pulmonary  Breath sounds clear to auscultation               Abdominal         Other Findings            Anesthetic Plan    ASA: 2  Anesthesia type: spinal          Induction: Intravenous  Anesthetic plan and risks discussed with: Patient

## 2022-10-20 NOTE — PROGRESS NOTES
10/20/2022  12:28 PM    CM met with BRENDA to complete initial assessment and begin discharge planning. MOB verified and confirmed demographics. BRENDA lives with her father, along with her children: 9, and 5, at the address on file. BRENDA is not employed and plans to be home with baby. [employer] is present and supportive. BRENDA reports she has good family support, and feels like she has the support she needs when she returns home. BRENDA plans to breast and bottle feed baby. [facility] will provide follow up care for infant. BRENDA has car seat, bassinet/crib, clothing, bottles and all necessary supplies for baby. BRENDA has [facility] Medicaid,  and will be adding baby to this policy. CM discussed process to add baby to insurance, BRENDA verbalized understanding. BRENDA is enrolled in [facility] benefits and understands how to add infant to program.  Care Management Interventions  PCP Verified by CM: Yes ([facility])  Mode of Transport at Discharge:  Other (see comment)  Transition of Care Consult (CM Consult): Discharge Planning  Support Systems: Spouse/Significant Other, Other Family Member(s)  Confirm Follow Up Transport: Family  Discharge Location  Patient Expects to be Discharged to[de-identified] Home with family assistance  Soledad Paz

## 2022-10-20 NOTE — L&D DELIVERY NOTE
Agree with delivery summary       Delivery Summary    Patient: Sai Goodwin MRN: [id number]  SSN: [id number]    YOB: 1995  Age: 32 y.o. Sex: female        Information for the patient's :  Betty Key Boneta Plane [[id number]]     Labor Events:    Labor: No    Steroids:     Cervical Ripening Date/Time:       Cervical Ripening Type:     Antibiotics During Labor:     Rupture Identifier:      Rupture Date/Time: 10/20/2022 10:12 AM   Rupture Type: AROM   Amniotic Fluid Volume: Moderate    Amniotic Fluid Description: Clear    Amniotic Fluid Odor: None    Induction: None       Induction Date/Time:        Indications for Induction:      Augmentation: None   Augmentation Date/Time:      Indications for Augmentation:     Labor complications: None       Additional complications:        Delivery Events:  Indications For Episiotomy:     Episiotomy:     Perineal Laceration(s):     Repaired:     Periurethral Laceration Location:      Repaired:     Labial Laceration Location:     Repaired:     Sulcal Laceration Location:     Repaired:     Vaginal Laceration Location:     Repaired:     Cervical Laceration Location:     Repaired:     Repair Suture:     Number of Repair Packets:     Estimated Blood Loss (ml):  ml   Quantitaive Blood Loss (ml):             Delivery Date: 10/20/2022    Delivery Time: 10:12 AM   Delivery Type: , Low Transverse     Details    Trial of Labor: No   Primary/Repeat: Repeat   Priority: Routine   Indications:          Sex:  Female     Gestational Age: 39w0d  Delivery Clinician:  Magaly Saucedo  Living Status: Living   Delivery Location: L&D            APGARS  One minute Five minutes Ten minutes   Skin color: 1   1        Heart rate: 2   2        Grimace: 2   2        Muscle tone: 2   2        Breathin   2        Totals: 9   9          Presentation:      Position:        Resuscitation Method:  Suctioning-bulb; Tactile Stimulation     Meconium Stained:        Cord Information: 3 Vessels  Complications: Nuchal Cord With Compressions  Cord around: head  Delayed cord clamping? Yes  Cord clamped date/time:10/20/2022 10:13 AM  Disposition of Cord Blood: Lab    Blood Gases Sent?: No    Placenta:  Date/Time: 10/20/2022 10:13 AM  Removal: Expressed      Appearance: Normal     [place] Measurements:  Birth Weight: 2.875 kg      Birth Length: 44.5 cm      Head Circumference: 34 cm      Chest Circumference: 31 cm     Abdominal Girth: 29 cm    Other Providers:   SENA QUEZADA;YESSI ADHIKARI;SUSAN BAUTISTA;;;;NICOLE OTERO;;;;NICOLE HASKINS;BRAN HU;LOVE TUCKER;BRODIE MEJIA;ADURA WEISS;MAXIMILIANO NEWSOME;LESLI FULTON, Obstetrician;Primary Nurse;Primary [place] Nurse;Nicu Nurse;Neonatologist;Anesthesiologist;Crna;Nurse Practitioner;Midwife;Nursery Nurse;Resident; Resident; Resident; Resident         Group B Strep: No results found for: EMMA Farrell  Information for the patient's :  Gardeniashahnaz , Female Gilma Coats [[id number]]   No results found for: ABORH, PCTABR, PCTDIG, BILI, ABORHEXT, ABORH   No results for input(s): PCO2CB, PO2CB, HCO3I, SO2I, IBD, PTEMPI, SPECTI, PHICB, ISITE, IDEV, IALLEN in the last 72 hours.

## 2022-10-20 NOTE — ANESTHESIA PROCEDURE NOTES
Spinal Block    Start time: 10/20/2022 9:43 AM  End time: 10/20/2022 9:50 AM  Performed by: Ale Tavarez CRNA  Authorized by: Hollie Vega DO     Pre-procedure:   Indications: primary anesthetic  Preanesthetic Checklist: patient identified, risks and benefits discussed, anesthesia consent, site marked, patient being monitored and timeout performed    Timeout Time: 09:43 EDT      Spinal Block:   Patient Position:  Seated    Prep: DuraPrep      Location:  L2-3  Technique:  Single shot      Med Admin Time: 10/20/2022 9:50 AM    Needle:   Needle Type:  Pencan  Needle Gauge:  25 G  Attempts:  1      Events: CSF confirmed, no blood with aspiration and no paresthesia        Assessment:  Insertion:  Uncomplicated  Patient tolerance:  Patient tolerated the procedure well with no immediate complications

## 2022-10-20 NOTE — LACTATION NOTE
This note was copied from a baby's chart. Experienced breastfeeding mother. This is her third baby to breastfeed. Baby able to latch on and breastfeed during [facility] visit. Discussed with mother her plan for feeding. Reviewed the benefits of exclusive breast milk feeding during the hospital stay. Informed her of the risks of using formula to supplement in the first few days of life as well as the benefits of successful breast milk feeding; referred her to the Breastfeeding booklet about this information. She acknowledges understanding of information reviewed and states that it is her plan to breast/bottle feed her infant. Will support her choice and offer additional information as needed. Encouraged mom to attempt feeding with baby led feeding cues. Just as sucking on fingers, rooting, mouthing. Looking for 8-12 feedings in 24 hours. Don't limit baby at breast, allow baby to come of breast on it's own. Baby may want to feed  often and may increase number of feedings on second day of life. Skin to skin encouraged. If baby doesn't nurse,  Mom should  hand express  10-20 drops of colostrum and drip into baby's mouth, or give to baby by finger feeding, cup feeding, or spoon feeding at least every 2-3 hours. Mother will successfully establish breastfeeding by feeding in response to early feeding cues   or wake every 3h, will obtain deep latch, and will keep log of feedings/output. Taught to BF at hunger cues and or q 2-3 hrs and to offer 10-20 drops of hand expressed colostrum at any non-feeds.       Breast Assessment  Left Breast: Medium  Left Nipple: Everted, Intact  Right Breast: Medium  Right Nipple: Everted, Intact  Breast- Feeding Assessment  Attends Breast-Feeding Classes: No  Breast-Feeding Experience: Yes (Breast fed 1st baby x 6 onths and 2nd baby x 12 months.)  Breast Trauma/Surgery: No  Type/Quality: Good  Lactation Consultant Visits  Breast-Feedings: Good  (Baby was fussy and rooting. LC assisted with diaper change with large bowel movement.  Baby put to breast - she latched on well and nursed well.)  Mother/Infant Observation  Mother Observation: Alignment, Holds breast, Breast comfortable, Close hold, Recognizes feeding cues  Infant Observation: Audible swallows, Opens mouth, Feeding cues, Rhythmic suck, Lips flanged, lower, Latches nipple and aereolae, Frenulum checked (tight frenulum - baby can extend tongue to lower gumline- instructed parents to discuss findings with pediatrician.)  LATCH Documentation  Latch: Grasps breast, tongue down, lips flanged, rhythmic sucking  Audible Swallowing: A few with stimulation  Type of Nipple: Everted (after stimulation)  Comfort (Breast/Nipple): Soft/non-tender  Hold (Positioning): Full assist, teach one side, mother does other, staff holds  [facility] Score: 8

## 2022-10-20 NOTE — H&P
History & Physical    Name: Apoorva Leon MRN: [id number]  SSN: [id number]    YOB: 1995  Age: 32 y.o. Sex: female      Subjective:     Estimated Date of Delivery: 10/27/22  OB History    Para Term  AB Living   3 2 1 1 0 2   SAB IAB Ectopic Molar Multiple Live Births   0 0 0 0 0 2      # Outcome Date GA Lbr Rodolfo/2nd Weight Sex Delivery Anes PTL Lv   3 Current            2 Term 03/26/15 39w0d  3.175 kg M CS-Unspec   DISHA   1  13 36w0d  2.722 kg M CS-Unspec   DISHA      Birth Comments: Section due to  bleeding, unknown details       Ms. Neelam Farah admitted with pregnancy at 39w0d for  section due to previous  section. Prenatal course was notable for hx of 2 prior C/S, first in  for  vaginal bleeding per patient, do not have full report as was done in [place]. Please see prenatal records for details. Only sx patient endorsed was minimal pink discharge and low back pain this morning. No roberto bleeding. Pt otherwise denies HA, changes in vision, Sob, CP, abd pain, VB, contractions, and LE pain/edema. +fetal movements. No past medical history on file. Past Surgical History:   Procedure Laterality Date    HX  SECTION  2015     Social History     Occupational History    Not on file   Tobacco Use    Smoking status: Never    Smokeless tobacco: Never   Substance and Sexual Activity    Alcohol use: Never    Drug use: Never    Sexual activity: Not on file     Family History   Problem Relation Age of Onset    No Known Problems Mother     No Known Problems Father     No Known Problems Sister     No Known Problems Brother     No Known Problems Maternal Grandmother     Diabetes Maternal Grandfather     No Known Problems Paternal Grandmother     No Known Problems Paternal Grandfather        No Known Allergies  Prior to Admission medications    Medication Sig Start Date End Date Taking?  Authorizing Provider   prenatal vit no.124/iron/folic (PRENATAL VITAMIN PO) Take  by mouth. Provider, Historical        Review of Systems: A comprehensive review of systems was negative except for that written in the History of Present Illness. Objective:     Vitals: There were no vitals filed for this visit. Physical Exam:  Patient without distress. Heart: Regular rate and rhythm, S1S2 present, or without murmur or extra heart sounds  Lung: clear to auscultation throughout lung fields, no wheezes, no rales, no rhonchi, and normal respiratory effort  Abdomen: soft, nontender  Fundus: soft and non tender  Lower Extremities:  - Edema No  Membranes:  Intact  Fetal Heart Rate: Reactive  Baseline: 140 per minute  Variability: moderate  Accelerations: yes  Decelerations: none  Uterine contractions: regular, every 7 minutes    Prenatal Labs:   No results found for: RUBELLAEXT, GRBSEXT, HBSAGEXT, HIVEXT, RPREXT, GONNOEXT, CHLAMEXT      Impression/Plan:     Ms. Diandra Rose is a [id number] admitted with pregnancy at 39w0d for repeat  section. Plan:    Admit for  section.    - Check CBC, T&S   - Ancef x1 for surgical prophylaxis   - Reviewed FHT, was reassuring Cat 1 tracing   - SCDs    SIUP: Group B Strep was negative. PNL: O+, Ab neg, G/C neg, VZV/Rubella immune,  RPR neg, HepB/HIV/Hep C neg, 3' tri HgB 12.0, Ucx neg. Passed 1-hr GTT. - Immunizations: S/p Tdap, s/p Flu  - U/S: Anatomy Scan at 20 weeks (22) - normal anatomy  - Genetics: NIPT Low risk female    3. History of  x2, History of  Delivery: at 36 weeks in [place] due to bleeding, per patient report. 4. Abnormal pap smear: Pap NILM, HPV positive (on 22). HPV 16, 18, 45 negative. - Repeat in 1 year. Discussed the risks of surgery including the risks of bleeding, infection, deep vein thrombosis, and surgical injuries to internal organs including but not limited to the bowels, bladder, rectum, and female reproductive organs.  The patient understands the risks; any and all questions were answered to the patient's satisfaction.     Patient discussed with Dr. Kimberly Garcia By:  Kenzie Sommer MD    Family Medicine Resident

## 2022-10-20 NOTE — ANESTHESIA POSTPROCEDURE EVALUATION
Procedure(s):   SECTION. spinal    Anesthesia Post Evaluation      Multimodal analgesia: multimodal analgesia not used between 6 hours prior to anesthesia start to PACU discharge  Pain management: adequate  Airway patency: patent  Anesthetic complications: no  Cardiovascular status: acceptable  Respiratory status: acceptable  Hydration status: acceptable    Final Post Anesthesia Temperature Assessment:  Normothermia (36.0-37.5 degrees C)      INITIAL Post-op Vital signs:   Vitals Value Taken Time   /67 10/20/22 1235   Temp 36.7 °C (98.1 °F) 10/20/22 1054   Pulse 61 10/20/22 1235   Resp 12 10/20/22 1054   SpO2 100 % 10/20/22 1243   Vitals shown include unvalidated device data.

## 2022-10-20 NOTE — PROGRESS NOTES
Problem: Patient Education: Go to Patient Education Activity  Goal: Patient/Family Education  Outcome: Progressing Towards Goal     Problem:  Delivery: Day of Delivery  Goal: Off Pathway (Use only if patient is Off Pathway)  Outcome: Progressing Towards Goal  Goal: Activity/Safety  Outcome: Progressing Towards Goal  Goal: Consults, if ordered  Outcome: Progressing Towards Goal  Goal: Diagnostic Test/Procedures  Outcome: Progressing Towards Goal  Goal: Nutrition/Diet  Outcome: Progressing Towards Goal  Goal: Discharge Planning  Outcome: Progressing Towards Goal  Goal: Medications  Outcome: Progressing Towards Goal  Goal: Respiratory  Outcome: Progressing Towards Goal  Goal: Treatments/Interventions/Procedures  Outcome: Progressing Towards Goal  Goal: Psychosocial  Outcome: Progressing Towards Goal  Goal: *Vital signs within defined limits  Outcome: Progressing Towards Goal  Goal: *Labs within defined limits  Outcome: Progressing Towards Goal  Goal: *Hemodynamically stable  Outcome: Progressing Towards Goal  Goal: *Optimal pain control at patient's stated goal  Outcome: Progressing Towards Goal  Goal: *Participates in infant care  Outcome: Progressing Towards Goal  Goal: *Demonstrates progressive activity  Outcome: Progressing Towards Goal  Goal: *Tolerating diet  Outcome: Progressing Towards Goal     Problem:  Delivery: Postpartum Day 1  Goal: Off Pathway (Use only if patient is Off Pathway)  Outcome: Progressing Towards Goal  Goal: Activity/Safety  Outcome: Progressing Towards Goal  Goal: Consults, if ordered  Outcome: Progressing Towards Goal  Goal: Diagnostic Test/Procedures  Outcome: Progressing Towards Goal  Goal: Nutrition/Diet  Outcome: Progressing Towards Goal  Goal: Discharge Planning  Outcome: Progressing Towards Goal  Goal: Medications  Outcome: Progressing Towards Goal  Goal: Respiratory  Outcome: Progressing Towards Goal  Goal: Treatments/Interventions/Procedures  Outcome: Progressing Towards Goal  Goal: Psychosocial  Outcome: Progressing Towards Goal  Goal: *Vital signs within defined limits  Outcome: Progressing Towards Goal  Goal: *Labs within defined limits  Outcome: Progressing Towards Goal  Goal: *Hemodynamically stable  Outcome: Progressing Towards Goal  Goal: *Optimal pain control at patient's stated goal  Outcome: Progressing Towards Goal  Goal: *Participates in infant care  Outcome: Progressing Towards Goal  Goal: *Demonstrates progressive activity  Outcome: Progressing Towards Goal  Goal: *Tolerating diet  Outcome: Progressing Towards Goal  Goal: *Performs self perineal care  Outcome: Progressing Towards Goal     Problem:  Delivery: Postpartum Day 2  Goal: Off Pathway (Use only if patient is Off Pathway)  Outcome: Progressing Towards Goal  Goal: Activity/Safety  Outcome: Progressing Towards Goal  Goal: Consults, if ordered  Outcome: Progressing Towards Goal  Goal: Nutrition/Diet  Outcome: Progressing Towards Goal  Goal: Discharge Planning  Outcome: Progressing Towards Goal  Goal: Medications  Outcome: Progressing Towards Goal  Goal: Treatments/Interventions/Procedures  Outcome: Progressing Towards Goal  Goal: Psychosocial  Outcome: Progressing Towards Goal  Goal: *Vital signs within defined limits  Outcome: Progressing Towards Goal  Goal: *Labs within defined limits  Outcome: Progressing Towards Goal  Goal: *Hemodynamically stable  Outcome: Progressing Towards Goal  Goal: *Optimal pain control at patient's stated goal  Outcome: Progressing Towards Goal  Goal: *Participates in infant care  Outcome: Progressing Towards Goal  Goal: *Demonstrates progressive activity  Outcome: Progressing Towards Goal  Goal: *Appropriate parent-infant bonding  Outcome: Progressing Towards Goal  Goal: *Tolerating diet  Outcome: Progressing Towards Goal     Problem:  Delivery: Postpartum Day 3  Goal: Off Pathway (Use only if patient is Off Pathway)  Outcome: Progressing Towards Goal  Goal: Activity/Safety  Outcome: Progressing Towards Goal  Goal: Consults, if ordered  Outcome: Progressing Towards Goal  Goal: Nutrition/Diet  Outcome: Progressing Towards Goal  Goal: Discharge Planning  Outcome: Progressing Towards Goal  Goal: Medications  Outcome: Progressing Towards Goal  Goal: Treatments/Interventions/Procedures  Outcome: Progressing Towards Goal  Goal: Psychosocial  Outcome: Progressing Towards Goal     Problem:  Delivery: Discharge Outcomes  Goal: *Follow-up appointments as indicated  Outcome: Progressing Towards Goal  Goal: *Describes available resources and support systems  Outcome: Progressing Towards Goal  Goal: *No signs and symptoms of infection  Outcome: Progressing Towards Goal  Goal: *Birth certificate information completed  Outcome: Progressing Towards Goal  Goal: *Received and verbalizes understanding of discharge plan and instructions  Outcome: Progressing Towards Goal  Goal: *Vital signs within defined limits  Outcome: Progressing Towards Goal  Goal: *Labs within defined limits  Outcome: Progressing Towards Goal  Goal: *Hemodynamically stable  Outcome: Progressing Towards Goal  Goal: *Optimal pain control at patient's stated goal  Outcome: Progressing Towards Goal  Goal: *Participates in infant care  Outcome: Progressing Towards Goal  Goal: *Demonstrates progressive activity  Outcome: Progressing Towards Goal  Goal: *Appropriate parent-infant bonding  Outcome: Progressing Towards Goal  Goal: *Tolerating diet  Outcome: Progressing Towards Goal  Goal: *Verbalizes name, dosage, time, side effects, and number of days to continue medications  Outcome: Progressing Towards Goal  Goal: *Influenza vaccine administered (October-March)  Outcome: Progressing Towards Goal     Problem: Pain  Goal: *Control of Pain  Outcome: Progressing Towards Goal  Goal: *PALLIATIVE CARE:  Alleviation of Pain  Outcome: Progressing Towards Goal     Problem: Patient Education: Go to Patient Education Activity  Goal: Patient/Family Education  Outcome: Progressing Towards Goal

## 2022-10-20 NOTE — OP NOTES
Operative Note    Name: Mario Last   Medical Record Number: [id number]   YOB: 1995  Today's Date: 2022      Pre-operative Diagnosis: Previous  delivery affecting pregnancy [O34.219]    Post-operative Diagnosis:TLBI    Operation: low transverse  section Procedure(s):   SECTION    Surgeon(s):  MD Ariel Chung, Vineet Zaragoza DO    Anesthesia: Spinal    EBL: 750cc    Prophylactic Antibiotics: Ancef  DVT Prophylaxis: Sequential Compression Devices         Fetal Description: timmons     Birth Information:   Information for the patient's :  Milton Pete, Female Jared Herman [[id number]]   Delivery of a   female infant on 10/20/2022 at 10:12 AM. Apgars were   and  . Umbilical Cord: 3 Vessels     Umbilical Cord Events: Nuchal Cord With Compressions     Placenta:   removal with   appearance. Amniotic Fluid Volume:        Amniotic Fluid Description:         Umbilical Cord: Nuchal Cord x  1    Placenta:  manual removal    Specimens: none           Complications:  none    Implants: none    Circ-2: Anna Campbell RN  Scrub Tech-1: Church Portal  Scrub Tech-2: Lennox Maria        Procedure Detail:      After proper patient identification and consent, the patient was taken to the operating room, where spinal anesthesia was administered and found to be adequate. Galindo catheter had been placed using sterile technique. The patient was prepped and draped in the normal sterile fashion. The abdomen was entered using the Pfannenstiel technique. The peritoneum was entered bluntely well superior to the bladder without any apparent injury. An Omid retractor was placed. Palpation revealed no bowel below the retractor. The bladder flap was created without difficulty. A low transverse uterine incision was made with the scalpel and extended with blunt finger dissection. Amniotomy was performed and the fluid was medium amount clear.   The babys head was then delivered atraumatically. The nose and mouth were suctioned. The cord was clamped and cut and the baby was handed off to Nursing staff in attendance. The placenta was directed. The uterus was wiped clean with a moist lap pad and cleared of all clots and debris. The uterine incision was closed in 2 layers, first with a running locked suture of 0-Monocryl, then with an imbricated layer. There was a small area of bleeding along the bladder dome and a suture of 3-0 Vicryl was placed. Adequate hemostasis was noted. Both tubes and ovaries appeared normal. The pericolic gutters were then lavaged clean with normal saline. Good hemostasis was again reassured The Omid retractor was removed. Several small areas of bleeding were noted along the muscle that were treated with the Bovie cautery and Surgicel was placed. The fascia was closed with stratifix symmetric in a running fashion. Good hemostasis was assured. The incision was lavaged clean and small bleeders were coagulated with the bovie. The skin was closed with absorbable staples. The patient tolerated the procedure well. Sponge, lap, and needle counts were correct times three and the patient and baby were taken to recovery/postpartum room in stable condition.     James Hirsch MD  October 20, 2022  11:04 AM

## 2022-10-20 NOTE — PROGRESS NOTES
0740:  here for R C/S.    1310: pt transferred to [facility]. Report given to Jamie Begum. Care of pt relinquished at this time.

## 2022-10-20 NOTE — PROGRESS NOTES
1315: TRANSFER - IN REPORT:    Verbal report received from OMAR Darling RN (name) on Linda Cage  being received from Labor and Delivery (unit) for routine progression of care      Report consisted of patients Situation, Background, Assessment and   Recommendations(SBAR). Information from the following report(s) SBAR, Kardex, Intake/Output, MAR, and Recent Results was reviewed with the receiving nurse. Opportunity for questions and clarification was provided. Assessment completed upon patients arrival to unit and care assumed.

## 2022-10-21 LAB
BASOPHILS # BLD: 0 K/UL (ref 0–0.1)
BASOPHILS NFR BLD: 1 % (ref 0–1)
DIFFERENTIAL METHOD BLD: ABNORMAL
EOSINOPHIL # BLD: 0.1 K/UL (ref 0–0.4)
EOSINOPHIL NFR BLD: 1 % (ref 0–7)
ERYTHROCYTE [DISTWIDTH] IN BLOOD BY AUTOMATED COUNT: 13.5 % (ref 11.5–14.5)
HCT VFR BLD AUTO: 32.4 % (ref 35–47)
HGB BLD-MCNC: 10.6 G/DL (ref 11.5–16)
IMM GRANULOCYTES # BLD AUTO: 0 K/UL (ref 0–0.04)
IMM GRANULOCYTES NFR BLD AUTO: 0 % (ref 0–0.5)
LYMPHOCYTES # BLD: 1.6 K/UL (ref 0.8–3.5)
LYMPHOCYTES NFR BLD: 20 % (ref 12–49)
MCH RBC QN AUTO: 31.9 PG (ref 26–34)
MCHC RBC AUTO-ENTMCNC: 32.7 G/DL (ref 30–36.5)
MCV RBC AUTO: 97.6 FL (ref 80–99)
MONOCYTES # BLD: 0.6 K/UL (ref 0–1)
MONOCYTES NFR BLD: 8 % (ref 5–13)
NEUTS SEG # BLD: 5.7 K/UL (ref 1.8–8)
NEUTS SEG NFR BLD: 71 % (ref 32–75)
NRBC # BLD: 0 K/UL (ref 0–0.01)
NRBC BLD-RTO: 0 PER 100 WBC
PLATELET # BLD AUTO: 258 K/UL (ref 150–400)
PMV BLD AUTO: 10.2 FL (ref 8.9–12.9)
RBC # BLD AUTO: 3.32 M/UL (ref 3.8–5.2)
WBC # BLD AUTO: 8.1 K/UL (ref 3.6–11)

## 2022-10-21 PROCEDURE — 65270000029 HC RM PRIVATE

## 2022-10-21 PROCEDURE — 74011250637 HC RX REV CODE- 250/637: Performed by: FAMILY MEDICINE

## 2022-10-21 PROCEDURE — 74011250636 HC RX REV CODE- 250/636: Performed by: ANESTHESIOLOGY

## 2022-10-21 PROCEDURE — 74011250637 HC RX REV CODE- 250/637

## 2022-10-21 PROCEDURE — 36415 COLL VENOUS BLD VENIPUNCTURE: CPT

## 2022-10-21 PROCEDURE — 85025 COMPLETE CBC W/AUTO DIFF WBC: CPT

## 2022-10-21 RX ORDER — PEPPERMINT OIL
SPIRIT ORAL
Status: DISCONTINUED
Start: 2022-10-21 | End: 2022-10-21 | Stop reason: WASHOUT

## 2022-10-21 RX ORDER — IBUPROFEN 800 MG/1
800 TABLET ORAL EVERY 8 HOURS
Status: DISCONTINUED | OUTPATIENT
Start: 2022-10-21 | End: 2022-10-22 | Stop reason: HOSPADM

## 2022-10-21 RX ADMIN — MUPIROCIN: 20 OINTMENT TOPICAL at 10:20

## 2022-10-21 RX ADMIN — KETOROLAC TROMETHAMINE 30 MG: 30 INJECTION, SOLUTION INTRAMUSCULAR at 03:18

## 2022-10-21 RX ADMIN — IBUPROFEN 800 MG: 800 TABLET, FILM COATED ORAL at 10:20

## 2022-10-21 RX ADMIN — ACETAMINOPHEN 650 MG: 325 TABLET, FILM COATED ORAL at 17:53

## 2022-10-21 RX ADMIN — IBUPROFEN 800 MG: 800 TABLET, FILM COATED ORAL at 17:53

## 2022-10-21 NOTE — LACTATION NOTE
This note was copied from a baby's chart. Mother has been breastfeeding baby and has offered baby formula for some feedings. Baby breast fed for 20 minutes just prior to [facility] visit. Reviewed breastfeeding basics:  Supply and demand, breastfeed baby 8-12 times in 24 hours, feed baby on demand,  stomach size, early  Feeding cues, skin to skin, positioning and baby led latch-on, assymetrical latch with signs of good, deep latch vs shallow, feeding frequency and duration, and log sheet for tracking infant feedings and output. Breastfeeding Booklet and Warm line information given. Discussed typical  weight loss and the importance of infant weight checks with pediatrician 1-2 post discharge. Care for sore/tender nipples discussed:  ways to improve positioning and latch practiced and discussed, hand express colostrum after feedings and let air dry, light application of lanolin, hydrogel pads, seek comfortable laid back feeding position, start feedings on least sore side first.     Discussed pumping/storage and preparation of expressed breast milk for baby. Mother will successfully establish breastfeeding by feeding in response to early feeding cues   or wake every 3h, will obtain deep latch, and will keep log of feedings/output. Taught to BF at hunger cues and or q 2-3 hrs and to offer 10-20 drops of hand expressed colostrum at any non-feeds.       Breast Assessment  Left Breast: Medium  Left Nipple: Everted, Intact  Right Breast: Medium  Right Nipple: Everted, Intact  Breast- Feeding Assessment  Attends Breast-Feeding Classes: No  Breast-Feeding Experience: Yes  Breast Trauma/Surgery: No  Type/Quality: Good (Mother is breast/formula feeding baby.)  Lactation Consultant Visits  Breast-Feedings: Good  (Baby had just finished nursing for 20 minutes on right breast.)  Mother/Infant Observation  Mother Observation: Alignment, Holds breast, Breast comfortable, Close hold, Recognizes feeding cues  Infant Observation: Frenulum checked (Tight frenulum - baby can extend tongue to lower gumline - instructed parents to discuss with pediatrician.  Resources given.)

## 2022-10-21 NOTE — PROGRESS NOTES
[address] [facility] [address] [place] [address]   Office [phone], Fax [phone]                               Post-Operative Day Number 1 Progress Note    Patient doing well post-op day 1 from  delivery without significant complaints. Pain well controlled. Lochia appropriate. Tolerating regular diet. Ambulating. Galindo removed with good output. Passing flatus. No BM. Vitals:  Patient Vitals for the past 8 hrs:   BP Temp Pulse Resp SpO2   10/21/22 0406 99/64 98.1 °F (36.7 °C) 86 16 95 %     Temp (24hrs), Av.9 °F (36.6 °C), Min:97.6 °F (36.4 °C), Max:98.1 °F (36.7 °C)      Vital signs stable, afebrile. Intake and Output:         Exam:   Patient without distress               CTAB, no w/r/r/c    RRR, + S1 and S2, no m/r/g    Abdomen soft, fundus firm and below the umbilicus, non tender                Incision covered with post-op bandaging, appropriately tender               Lower extremities negative for swelling, no cords or tenderness, SCDs in place    Lab/Data Review:  Recent Results (from the past 12 hour(s))   CBC WITH AUTOMATED DIFF    Collection Time: 10/21/22  4:13 AM   Result Value Ref Range    WBC 8.1 3.6 - 11.0 K/uL    RBC 3.32 (L) 3.80 - 5.20 M/uL    HGB 10.6 (L) 11.5 - 16.0 g/dL    HCT 32.4 (L) 35.0 - 47.0 %    MCV 97.6 80.0 - 99.0 FL    MCH 31.9 26.0 - 34.0 PG    MCHC 32.7 30.0 - 36.5 g/dL    RDW 13.5 11.5 - 14.5 %    PLATELET 095 792 - 784 K/uL    MPV 10.2 8.9 - 12.9 FL    NRBC 0.0 0  WBC    ABSOLUTE NRBC 0.00 0.00 - 0.01 K/uL    NEUTROPHILS 71 32 - 75 %    LYMPHOCYTES 20 12 - 49 %    MONOCYTES 8 5 - 13 %    EOSINOPHILS 1 0 - 7 %    BASOPHILS 1 0 - 1 %    IMMATURE GRANULOCYTES 0 0.0 - 0.5 %    ABS. NEUTROPHILS 5.7 1.8 - 8.0 K/UL    ABS. LYMPHOCYTES 1.6 0.8 - 3.5 K/UL    ABS. MONOCYTES 0.6 0.0 - 1.0 K/UL    ABS. EOSINOPHILS 0.1 0.0 - 0.4 K/UL    ABS. BASOPHILS 0.0 0.0 - 0.1 K/UL    ABS. IMM.  GRANS. 0.0 0.00 - 0.04 K/UL    DF AUTOMATED           Assessment and Plan:    Mone Brandon is a 32 y.o. [id number] s/p elective repeat CS at 39 weeks 0 days. Pregnancy notable for h/o Csx2 including  delivery due to uncertain problem with vaginal bleeding (no notes from prior pregnancies as was performed in [place]) and abnormal pap smear. Patient appears to be having uncomplicated post- course. Continue routine post-op care and maternal education. Post-op Hgb stable. Incision bandage may be removed 24 hours post-op. Abnormal pap smear: pap NILM, hpv+ (22). Follow up outpatient for repeat in 1 year. Patient discussed with Dr. Kati Baig.     Elham Hook MD  Family Medicine Resident

## 2022-10-21 NOTE — PROGRESS NOTES
2100 - Assisted pt with ambulation to bathroom. Provided demarcus care. Patient passed one quarter-sized clot. Performed fundal check upon returning to bed. U-1, small lochia. No more clots were expressed during fundal massage. Urine output ~42 ml/hr and loni in color. Encouraged patient to increase fluid intake, and will monitor closely. Xie to remain in at this time to ensure urinary output continues to be adequate. 2300 - Urine output is now >100ml/hr, clear/yellow in color. Removed xie catheter and instructed pt to call for assistance before getting OOB to void.

## 2022-10-21 NOTE — ROUTINE PROCESS
Bedside and Verbal shift change report given to ASAEL Hatfield RN (oncoming nurse). Report included the following information SBAR, Kardex, Intake/Output, MAR, and Recent Results.

## 2022-10-22 VITALS
DIASTOLIC BLOOD PRESSURE: 66 MMHG | TEMPERATURE: 98.1 F | HEIGHT: 56 IN | OXYGEN SATURATION: 95 % | BODY MASS INDEX: 32.39 KG/M2 | RESPIRATION RATE: 16 BRPM | SYSTOLIC BLOOD PRESSURE: 99 MMHG | HEART RATE: 73 BPM | WEIGHT: 144 LBS

## 2022-10-22 PROCEDURE — 74011250637 HC RX REV CODE- 250/637: Performed by: FAMILY MEDICINE

## 2022-10-22 PROCEDURE — 74011250637 HC RX REV CODE- 250/637

## 2022-10-22 PROCEDURE — 2709999900 HC NON-CHARGEABLE SUPPLY

## 2022-10-22 RX ORDER — OXYCODONE AND ACETAMINOPHEN 5; 325 MG/1; MG/1
1 TABLET ORAL
Qty: 6 TABLET | Refills: 0 | Status: SHIPPED | OUTPATIENT
Start: 2022-10-22 | End: 2022-10-25

## 2022-10-22 RX ORDER — IBUPROFEN 800 MG/1
800 TABLET ORAL
Qty: 30 TABLET | Refills: 0 | Status: SHIPPED | OUTPATIENT
Start: 2022-10-22

## 2022-10-22 RX ORDER — DOCUSATE SODIUM 100 MG/1
100 CAPSULE, LIQUID FILLED ORAL 2 TIMES DAILY
Qty: 60 CAPSULE | Refills: 0 | Status: SHIPPED | OUTPATIENT
Start: 2022-10-22 | End: 2023-01-20

## 2022-10-22 RX ADMIN — ACETAMINOPHEN 650 MG: 325 TABLET, FILM COATED ORAL at 09:28

## 2022-10-22 RX ADMIN — ACETAMINOPHEN 650 MG: 325 TABLET, FILM COATED ORAL at 01:59

## 2022-10-22 RX ADMIN — IBUPROFEN 800 MG: 800 TABLET, FILM COATED ORAL at 09:28

## 2022-10-22 RX ADMIN — IBUPROFEN 800 MG: 800 TABLET, FILM COATED ORAL at 01:59

## 2022-10-22 NOTE — DISCHARGE SUMMARY
Obstetrical Discharge Summary     Name: Job Crump MRN: [id number]  SSN: [id number]    YOB: 1995  Age: 32 y.o. Sex: female      Admit Date: 10/20/2022    Discharge Date: 10/22/2022     Admitting Physician: Rogelio Spann DO     Attending Physician:  Hannah Mccollum DO     Admission Diagnoses: Previous  delivery affecting pregnancy [O34.219]  Pregnancy [Z34.90]    Discharge Diagnoses:   Information for the patient's :  Mariaelena Cage, Female Mark Torres [[id number]]   Delivery of a 2.875 kg female infant via , Low Transverse on 10/20/2022 at 10:12 AM  by Cassi Red. Apgars were 9  and 9 . Additional Diagnoses:   Hospital Problems  Date Reviewed: 10/20/2022            Codes Class Noted POA    Pregnancy ICD-10-CM: Z34.90  ICD-9-CM: V22.2  10/20/2022 Unknown        No results found for: RUBELLAEXT, GRBSEXT    Immunization(s):   Immunization History   Administered Date(s) Administered    Influenza, FLUARIX, FLULAVAL, FLUZONE (age 10 mo+) AND AFLURIA, (age 1 y+), PF, 0.5mL 2022    Tdap 08/10/2022        Hospital Course:   Patient is a 32 y.o.  [id number] s/p elective repeat CS at 39 weeks 0 days. Presented for  repeat LTCS with  . Pregnancy notable for  h/o Csx2 including  delivery due to uncertain problem with vaginal bleeding (no notes from prior pregnancies as was performed in [place]) and abnormal pap smear. Labor was uncomplicated. Delivered TLFI by LTCS without complications. Normal hospital course following the delivery. On day of discharge patient reported minimal lochia, well controlled pain, and no other complaints. Discharged with pain regimen and bowel regimen. Advised to continue prenatal vitamins. Follow up with Dr. Phyllis Neville in 2 weeks.       Depression Scale       0    Follow up test at discharge:N/a   Condition at Discharge:  stable  Disposition: Discharge to Home    Physical exam:  Visit Vitals  /69 (BP Patient Position: At rest)   Pulse 83   Temp 98.1 °F (36.7 °C)   Resp 16   Ht 4' 8\" (1.422 m)   Wt 65.3 kg (144 lb)   LMP 02/06/2022   SpO2 98%   Breastfeeding Unknown   BMI 32.28 kg/m²       Exam:  Patient without distress. CTAB, no w/r/r/c               RRR, + S1 and S2, no m/r/g               Abdomen soft, fundus firm at level of umbilicus, non tender               Perineum with normal lochia noted. Lower extremities are negative for swelling, cords or tenderness. Patient Instructions:   Current Discharge Medication List        CONTINUE these medications which have NOT CHANGED    Details   prenatal vit no.124/iron/folic (PRENATAL VITAMIN PO) Take  by mouth. Reference my discharge instructions.     Follow-up Information       Follow up With Specialties Details Why Contact Info    Kirsten Roca MD Family Medicine Follow up on 11/3/2022 At 3:40 [address]  [phone]      Harmeet Alexander MD Obstetrics & Gynecology, Gynecology, Obstetrics Follow up on 12/5/2022 Your appointment time is 1:10 PM [address] [place], [place] [address]  [phone]                Signed By:  Raissa Cifuentes MD    Family Medicine Resident

## 2022-10-22 NOTE — DISCHARGE INSTRUCTIONS
Patient Discharge Instructions    Calvin Beard / [id number] : 1995    Admitted 10/20/2022 Discharged: 10/22/2022       Por favor tenga miguel documento presente en toney michel de seguimiento con toney médico primario. Primary care provider:  @PCP@    Discharging provider:  Gabriella Rouse MD  - Family Medicine Resident  Dr. Beltran Catrachita:  Previous  delivery affecting pregnancy [O34.219]  Pregnancy [Z34.90]        RECOMENDACIONES DE CUIDADO:         Continue Tratamiento:  - Por favor continue Motrin 800 mg, 1 tableta cada 8 horas por los próximos 2 días, luego 1 tableta cada 8 horas mientras sea necesario para el dolor.  - Por favor continue Percocet 5-325 mg, tome 1 tableta cada 4 horas mientras sea necesario para el dolor.  - Por favor continue Colace 100 mg para el estreñimiento, tome 1 tableta dos veces al día hasta que pueda defercar regularmente sin necesidad del medicamento. - Por favor continue tomando radha vitaminas prenatales. Pruebas de seguimiento que necesita: nada    Resultados pendientes:   En el momento de toney de yusuf los resultados de las siguientes pruebas están pendiente:  nada. Por favor discuta estos resultados con toney proveedor primario en toney michel de seguimiento. Importante que [employer] siguientes síntomas: dolor de [place], falta de [place], [place], escalofríos, náusea, vómito, diarrea, cambios en toney estado mental, caídas, debilidad y sangrado. DIETA/que comer:   Regular    ACTIVIDAD FÍSICA:   Instrucciones de [facility]    No levante nada que sea más pesado que toney bebé por las próximas 6 semanas. No insertar nada por la vaginal por 6 semanas. Luego del parto por cesárea/ vaginal debe evitar tener relaciones sexuales por 6 semanas. Tendrá toney michel de seguimiento posparto a las 6 semanas. Puede manejar toney vehículo mientras no esté tomando Percocet ni ningún medicamento nárcotico (Motrin está christel). Cuidado de Herida:   Puede remover el vendaje de toney herida en 1 semana. Puede bañarse jaquan de costumbre. NO se restriegue la herida. Deje que el agua tibia con jabón corra por encima de la herida. No se sumerga en la bañera con [place], no vaya a nadar hasta que toney médico diga que es apropiado. Entiendo que si surge algún problema cuando llegue a mi hogar puedo contactar a mi médico.    Me mojica explicado las siguientes instrucciones y mojica aclarado mis dudas. Entiendo y reconozco las instrucciones brindadas.                                                                                                                                                Firma de Phoebe Ruby / Surekha Grapes                                                                 Fecha/Hora                                                                                                                                              Firma de paciente ó representante                                                         Fecha/Hora

## 2022-10-24 ENCOUNTER — DOCUMENTATION ONLY (OUTPATIENT)
Dept: FAMILY MEDICINE CLINIC | Age: 27
End: 2022-10-24

## 2022-10-24 NOTE — PROGRESS NOTES
EPDS  In the past 7 days:  1. I have been able to laugh and see the funny side of things 0 - As much as I always could  2. I have looked forward with enjoyment to things 0 - As much as I ever did  3. I have blamed myself unnecessarily when things went wron - No, never  4. I have been anxious or worried for no good reason: 0 - No, not at all  5. I have felt scared or panicky for no very good reason: 0 - No, not at all  6. Things have been getting on top of me: 0 - No, I have been coping as well as ever  7. I have been so unhappy that I have had difficulty sleepin - No, not at all  8. I have felt sad or miserable: 0 - No, not at all  9. I have been so uhnappy that I have been cryin - No, never  10.  The thought of harming myself has occurred to me: 0 - No, never

## 2022-11-03 ENCOUNTER — OFFICE VISIT (OUTPATIENT)
Dept: FAMILY MEDICINE CLINIC | Age: 27
End: 2022-11-03
Payer: MEDICAID

## 2022-11-03 VITALS
SYSTOLIC BLOOD PRESSURE: 108 MMHG | TEMPERATURE: 97.8 F | OXYGEN SATURATION: 100 % | WEIGHT: 124 LBS | HEART RATE: 84 BPM | HEIGHT: 56 IN | BODY MASS INDEX: 27.9 KG/M2 | DIASTOLIC BLOOD PRESSURE: 57 MMHG | RESPIRATION RATE: 17 BRPM

## 2022-11-03 PROCEDURE — 0503F POSTPARTUM CARE VISIT: CPT | Performed by: STUDENT IN AN ORGANIZED HEALTH CARE EDUCATION/TRAINING PROGRAM

## 2022-11-03 NOTE — PROGRESS NOTES
Chief Complaint   Patient presents with    Post-Partum Care     1. Have you been to the ER, urgent care clinic since your last visit? Hospitalized since your last visit? Yes. [facility]. 2. Have you seen or consulted any other health care providers outside of the [address] [place] [address] since your last visit? Include any pap smears or colon screening.  No

## 2022-11-03 NOTE — PROGRESS NOTES
.    [address]   Office [phone], Fax [phone]    Subjective:     Chief Complaint   Patient presents with    Post-Partum Care       History provided by patient     2 week  incision check and Post Partum Note    32 y.o. female , presenting for 2 week postpartum visit and incision check s/p  at 39w0d. Patient doing well post-partum without significant complaint. Lochia: normal  Pain: controlled  Baby: doing well, has seen PCP  Sexual activity: not currently  Plan for contraception: would like to start birth control, undecided. Considering injections vs alternate method  Breast/bottle: breast and formula feeding   Support from FOB/family: has good support at home   Symptoms of depression: denies symptoms. Denies SI/HI. Sleep is appropriate. EDPS score: 0    SocHx. 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 No chills. HENT: No congestion. No rhinorrhea. No sore throat. Eyes: No eye pain. No eye redness. Respiratory: No cough. No shortness of breath. Cardio: No chest pain. No leg swelling. No palpitations. GI: No abd pain. No n/v. No diarrhea. No constipation. : No frequency. No dysuria. No urgency. MSK: No back pain. No neck pain. Neuro: No headaches. No dizziness. Objective:     Visit Vitals  BP (!) 108/57   Pulse 84   Temp 97.8 °F (36.6 °C) (Temporal)   Resp 17   Ht 4' 8\" (1.422 m)   Wt 124 lb (56.2 kg)   SpO2 100%   BMI 27.80 kg/m²           Exam:  Patient without distress. Abdomen soft, fundus firm at level of umbilicus, nontender. Skin: low transverse  incision is clean dry and intact. No sign of infection. Steri strips still in place. Lower extremities:  No edema. No palpable cords or tenderness.       Pertinent Labs/Studies:       Assessment and orders:     Assessment and Plan: Sagar Hunter is a 32 y.o. [id number] s/p  at 39 weeks 0 days. Patient having uncomplicated post-partum course. Continue routine care  Call clinic or make appointment for symptoms of sadness  Follow up for yearly well woman exam.    Advised to remove steri strips today. Counseled on moistening area under running water to help alleviate discomfort during removal.   Discuss contraception again at next visit       ICD-10-CM ICD-9-CM    1. Postpartum care and examination  Z39.2 V24.2           Follow Up: 4 weeks for 6 week postpartum visit      Pt was discussed with Dr Rea Nettles (attending physician). I have reviewed patient medical and social history and medications. I have reviewed pertinent labs results and other data. I have discussed the diagnosis with the patient and the intended plan as seen in the above orders. The patient has received an after-visit summary and questions were answered concerning future plans. I have discussed medication side effects and warnings with the patient as well.     Librado Boudreaux MD  [facility]

## 2022-11-30 NOTE — PROGRESS NOTES
Apoorva Leon is a 32 y.o. female returns for a routine post-partum follow-up visit     No chief complaint on file. Postpartum Depression: Low Risk     Last EPDS Total Score: 0    Last EPDS Self Harm Result: Never       Type of delivery: repeat  section  Date of Delivery: 10/20/2022  Breastfeeding: no  Bleeding Resolved:  yes  Birth Control: none. Would like OCP  Last Pap: 2022 normal/HPV neg        Problems: no significant problems    1. Have you been to the ER, urgent care clinic, or hospitalized since your last visit? No    2. Have you seen or consulted any other health care providers outside of the [address] [place] [address] since your last visit?  No    Examination chaperoned by Dina Gutierrez RN

## 2022-12-05 ENCOUNTER — OFFICE VISIT (OUTPATIENT)
Dept: OBGYN CLINIC | Age: 27
End: 2022-12-05
Payer: MEDICAID

## 2022-12-05 VITALS — BODY MASS INDEX: 25.33 KG/M2 | SYSTOLIC BLOOD PRESSURE: 112 MMHG | DIASTOLIC BLOOD PRESSURE: 77 MMHG | WEIGHT: 113 LBS

## 2022-12-05 PROCEDURE — 99024 POSTOP FOLLOW-UP VISIT: CPT | Performed by: OBSTETRICS & GYNECOLOGY

## 2022-12-05 RX ORDER — NORETHINDRONE ACETATE AND ETHINYL ESTRADIOL 1MG-20(21)
1 KIT ORAL DAILY
Qty: 3 DOSE PACK | Refills: 1 | Status: SHIPPED | OUTPATIENT
Start: 2022-12-05

## 2022-12-05 NOTE — PROGRESS NOTES
Postpartum evaluation    Carlos Rosa is a 32 y.o. female who presents for a postpartum exam.     She is now six weeks post repeat  section. Her baby is doing well. She has had no menses since delivery. She has had the following significant problems since her delivery: none    The patient is bottle feeding without difficulty. She is currently taking: no medications.          Visit Vitals  /77   Wt 113 lb (51.3 kg)   LMP 2022 (Exact Date)   Breastfeeding No   BMI 25.33 kg/m²       PHYSICAL EXAMINATION    Constitutional  Appearance: well-nourished, well developed, alert, in no acute distress    HENT  Head and Face: appears normal    Neck  Inspection/Palpation: normal appearance, no masses or tenderness  Lymph Nodes: no lymphadenopathy present  Thyroid: gland size normal, nontender, no nodules or masses present on palpation    Breasts  Inspection of Breasts: breasts symmetrical, no skin changes, no discharge present, nipple appearance normal, no skin retraction present  Palpation of Breasts and Axillae: no masses present on palpation, no breast tenderness  Axillary Lymph Nodes: no lymphadenopathy present    Gastrointestinal  Abdominal Examination: abdomen non-tender to palpation, normal bowel sounds, no masses present  Liver and spleen: no hepatomegaly present, spleen not palpable  Hernias: no hernias identified    Genitourinary  External Genitalia: normal appearance for age, no discharge present, no tenderness present, no inflammatory lesions present, no masses present, no atrophy present  Vagina: normal vaginal vault without central or paravaginal defects, no discharge present, no inflammatory lesions present, no masses present  Bladder: non-tender to palpation  Urethra: appears normal  Cervix: normal   Uterus: normal size, shape and consistency  Adnexa: no adnexal tenderness present, no adnexal masses present  Perineum: perineum within normal limits, no evidence of trauma, no rashes or skin lesions present  Anus: anus within normal limits, no hemorrhoids present  Inguinal Lymph Nodes: no lymphadenopathy present    Skin  General Inspection: no rash, no lesions identified    Neurologic/Psychiatric  Mental Status:  Orientation: grossly oriented to person, place and time  Mood and Affect: mood normal, affect appropriate    Assessment:  Normal postpartum check  Hx pap HPV pos 6/2022 - needs repeat pap one year    Plan:  RTO for AE. Patient was unaware that she tested positive for HPV and needed a follow-up. She would like to come back here to have her regular checkup in June.   Start OCP

## 2023-08-30 ENCOUNTER — OFFICE VISIT (OUTPATIENT)
Age: 28
End: 2023-08-30
Payer: MEDICAID

## 2023-08-30 ENCOUNTER — HOSPITAL ENCOUNTER (OUTPATIENT)
Facility: HOSPITAL | Age: 28
Setting detail: SPECIMEN
Discharge: HOME | End: 2023-09-02
Payer: MEDICAID

## 2023-08-30 VITALS
TEMPERATURE: 98.2 F | BODY MASS INDEX: 28.02 KG/M2 | DIASTOLIC BLOOD PRESSURE: 61 MMHG | SYSTOLIC BLOOD PRESSURE: 110 MMHG | OXYGEN SATURATION: 98 % | WEIGHT: 125 LBS | HEART RATE: 68 BPM

## 2023-08-30 DIAGNOSIS — Z01.419 WELL WOMAN EXAM WITH ROUTINE GYNECOLOGICAL EXAM: Primary | ICD-10-CM

## 2023-08-30 PROCEDURE — PBSHW HPV, GARDASIL 9, (AGE 9-45 YRS), IM: Performed by: STUDENT IN AN ORGANIZED HEALTH CARE EDUCATION/TRAINING PROGRAM

## 2023-08-30 PROCEDURE — 87591 N.GONORRHOEAE DNA AMP PROB: CPT

## 2023-08-30 PROCEDURE — 87661 TRICHOMONAS VAGINALIS AMPLIF: CPT

## 2023-08-30 PROCEDURE — 99395 PREV VISIT EST AGE 18-39: CPT | Performed by: STUDENT IN AN ORGANIZED HEALTH CARE EDUCATION/TRAINING PROGRAM

## 2023-08-30 PROCEDURE — 87491 CHLMYD TRACH DNA AMP PROBE: CPT

## 2023-08-30 PROCEDURE — 90651 9VHPV VACCINE 2/3 DOSE IM: CPT | Performed by: STUDENT IN AN ORGANIZED HEALTH CARE EDUCATION/TRAINING PROGRAM

## 2023-08-30 PROCEDURE — 88175 CYTOPATH C/V AUTO FLUID REDO: CPT

## 2023-08-30 SDOH — ECONOMIC STABILITY: FOOD INSECURITY: WITHIN THE PAST 12 MONTHS, YOU WORRIED THAT YOUR FOOD WOULD RUN OUT BEFORE YOU GOT MONEY TO BUY MORE.: NEVER TRUE

## 2023-08-30 SDOH — ECONOMIC STABILITY: INCOME INSECURITY: HOW HARD IS IT FOR YOU TO PAY FOR THE VERY BASICS LIKE FOOD, HOUSING, MEDICAL CARE, AND HEATING?: NOT HARD AT ALL

## 2023-08-30 SDOH — ECONOMIC STABILITY: FOOD INSECURITY: WITHIN THE PAST 12 MONTHS, THE FOOD YOU BOUGHT JUST DIDN'T LAST AND YOU DIDN'T HAVE MONEY TO GET MORE.: NEVER TRUE

## 2023-08-30 SDOH — ECONOMIC STABILITY: HOUSING INSECURITY
IN THE LAST 12 MONTHS, WAS THERE A TIME WHEN YOU DID NOT HAVE A STEADY PLACE TO SLEEP OR SLEPT IN A SHELTER (INCLUDING NOW)?: NO

## 2023-08-30 ASSESSMENT — PATIENT HEALTH QUESTIONNAIRE - PHQ9
SUM OF ALL RESPONSES TO PHQ QUESTIONS 1-9: 0
2. FEELING DOWN, DEPRESSED OR HOPELESS: 0
SUM OF ALL RESPONSES TO PHQ9 QUESTIONS 1 & 2: 0
1. LITTLE INTEREST OR PLEASURE IN DOING THINGS: 0

## 2023-09-01 LAB
C TRACH RRNA SPEC QL NAA+PROBE: NEGATIVE
N GONORRHOEA RRNA SPEC QL NAA+PROBE: NEGATIVE
SPECIMEN SOURCE: NORMAL
T VAGINALIS RRNA SPEC QL NAA+PROBE: NEGATIVE

## 2023-10-30 ENCOUNTER — OFFICE VISIT (OUTPATIENT)
Age: 28
End: 2023-10-30
Payer: MEDICAID

## 2023-10-30 VITALS
BODY MASS INDEX: 28.58 KG/M2 | HEART RATE: 77 BPM | SYSTOLIC BLOOD PRESSURE: 101 MMHG | WEIGHT: 127.5 LBS | DIASTOLIC BLOOD PRESSURE: 67 MMHG | OXYGEN SATURATION: 98 %

## 2023-10-30 DIAGNOSIS — Z23 ENCOUNTER FOR IMMUNIZATION: Primary | ICD-10-CM

## 2023-10-30 PROCEDURE — 90471 IMMUNIZATION ADMIN: CPT | Performed by: FAMILY MEDICINE

## 2023-10-30 PROCEDURE — PBSHW PBB SHADOW CHARGE: Performed by: FAMILY MEDICINE

## 2023-10-30 PROCEDURE — PBSHW HPV, GARDASIL 9, (AGE 9-45 YRS), IM: Performed by: FAMILY MEDICINE

## 2023-10-30 ASSESSMENT — PATIENT HEALTH QUESTIONNAIRE - PHQ9
2. FEELING DOWN, DEPRESSED OR HOPELESS: 0
SUM OF ALL RESPONSES TO PHQ QUESTIONS 1-9: 0
SUM OF ALL RESPONSES TO PHQ9 QUESTIONS 1 & 2: 0
SUM OF ALL RESPONSES TO PHQ QUESTIONS 1-9: 0
1. LITTLE INTEREST OR PLEASURE IN DOING THINGS: 0
SUM OF ALL RESPONSES TO PHQ QUESTIONS 1-9: 0
SUM OF ALL RESPONSES TO PHQ QUESTIONS 1-9: 0

## 2023-10-30 NOTE — PROGRESS NOTES
Chief Complaint   Patient presents with    Immunizations     2nd dose of hpv per doctor     Vitals:    10/30/23 0804   BP: 101/67   Pulse: 77   SpO2: 98%

## (undated) DEVICE — REM POLYHESIVE ADULT PATIENT RETURN ELECTRODE: Brand: VALLEYLAB

## (undated) DEVICE — SUTURE MCRYL SZ 0 L36IN ABSRB UD L36MM CT-1 1/2 CIR Y946H

## (undated) DEVICE — C-SECTION II-LF: Brand: MEDLINE INDUSTRIES, INC.

## (undated) DEVICE — MASTISOL ADHESIVE LIQ 2/3ML

## (undated) DEVICE — TRAY,URINE METER,100% SILICONE,16FR10ML: Brand: MEDLINE

## (undated) DEVICE — GARMENT,MEDLINE,DVT,INT,CALF,MED, GEN2: Brand: MEDLINE

## (undated) DEVICE — 3M™ MEDIPORE™ H SOFT CLOTH TAPE 2862S: Brand: 3M™ MEDIPORE™

## (undated) DEVICE — HANDLE LT SNAP ON ULT DURABLE LENS FOR TRUMPF ALC DISPOSABLE

## (undated) DEVICE — TOWEL,OR,DSP,ST,BLUE,STD,2/PK,40PK/CS: Brand: MEDLINE

## (undated) DEVICE — TIP CLEANER: Brand: VALLEYLAB

## (undated) DEVICE — STRIP,CLOSURE,WOUND,MEDI-STRIP,1/2X4: Brand: MEDLINE

## (undated) DEVICE — SOLIDIFIER FLUID 3000 CC ABSORB

## (undated) DEVICE — ROCKER SWITCH PENCIL HOLSTER: Brand: VALLEYLAB

## (undated) DEVICE — LARGE, DISPOSABLE ALEXIS O C-SECTION PROTECTOR - RETRACTOR: Brand: ALEXIS ® O C-SECTION PROTECTOR - RETRACTOR

## (undated) DEVICE — GLOVE SURG SZ 65 THK91MIL LTX FREE SYN POLYISOPRENE

## (undated) DEVICE — STRAP,POSITIONING,KNEE/BODY,FOAM,4X60": Brand: MEDLINE

## (undated) DEVICE — PAD,ABDOMINAL,5"X9",ST,LF,25/BX: Brand: MEDLINE INDUSTRIES, INC.

## (undated) DEVICE — SUTURE STRATAFIX SYMMETRIC PDS + SZ 1 L18IN ABSRB VLT L48MM SXPP1A400

## (undated) DEVICE — GOWN,AURORA,FABRIC-REINFORCED,X-LARGE: Brand: MEDLINE

## (undated) DEVICE — PREP SKN CHLRAPRP APL 26ML STR --

## (undated) DEVICE — STERILE POLYISOPRENE POWDER-FREE SURGICAL GLOVES: Brand: PROTEXIS

## (undated) DEVICE — GLOVE ORANGE PI 7 1/2   MSG9075

## (undated) DEVICE — SOLUTION IRRIG 1000ML 0.9% SOD CHL USP POUR PLAS BTL

## (undated) DEVICE — GLOVE SURG SZ 6 THK91MIL LTX FREE SYN POLYISOPRENE ANTI

## (undated) DEVICE — SUTURE VCRL SZ 3-0 L27IN ABSRB UD KS L60MM STR REV CUT NDL J663H

## (undated) DEVICE — CANISTER, RIGID, 3000CC: Brand: MEDLINE INDUSTRIES, INC.